# Patient Record
Sex: FEMALE | NOT HISPANIC OR LATINO | URBAN - METROPOLITAN AREA
[De-identification: names, ages, dates, MRNs, and addresses within clinical notes are randomized per-mention and may not be internally consistent; named-entity substitution may affect disease eponyms.]

---

## 2017-01-06 ENCOUNTER — COMMUNICATION - HEALTHEAST (OUTPATIENT)
Dept: FAMILY MEDICINE | Facility: CLINIC | Age: 52
End: 2017-01-06

## 2017-01-06 DIAGNOSIS — I10 ESSENTIAL HYPERTENSION: ICD-10-CM

## 2017-01-09 ENCOUNTER — COMMUNICATION - HEALTHEAST (OUTPATIENT)
Dept: SCHEDULING | Facility: CLINIC | Age: 52
End: 2017-01-09

## 2017-01-09 DIAGNOSIS — E87.6 POTASSIUM DEFICIENCY: ICD-10-CM

## 2017-01-27 ENCOUNTER — OFFICE VISIT - HEALTHEAST (OUTPATIENT)
Dept: FAMILY MEDICINE | Facility: CLINIC | Age: 52
End: 2017-01-27

## 2017-01-27 DIAGNOSIS — I10 ESSENTIAL HYPERTENSION: ICD-10-CM

## 2017-01-27 DIAGNOSIS — L68.9 EXCESSIVE HAIR GROWTH: ICD-10-CM

## 2017-01-27 ASSESSMENT — MIFFLIN-ST. JEOR: SCORE: 1155.04

## 2017-07-21 ENCOUNTER — OFFICE VISIT - HEALTHEAST (OUTPATIENT)
Dept: FAMILY MEDICINE | Facility: CLINIC | Age: 52
End: 2017-07-21

## 2017-07-21 DIAGNOSIS — Z00.00 HEALTH CARE MAINTENANCE: ICD-10-CM

## 2017-07-21 DIAGNOSIS — M25.551 HIP PAIN, CHRONIC, RIGHT: ICD-10-CM

## 2017-07-21 DIAGNOSIS — B35.1 TOENAIL FUNGUS: ICD-10-CM

## 2017-07-21 DIAGNOSIS — G89.29 HIP PAIN, CHRONIC, RIGHT: ICD-10-CM

## 2017-07-21 LAB
CHOLEST SERPL-MCNC: 182 MG/DL
FASTING STATUS PATIENT QL REPORTED: YES
HDLC SERPL-MCNC: 62 MG/DL
LDLC SERPL CALC-MCNC: 110 MG/DL
TRIGL SERPL-MCNC: 50 MG/DL

## 2017-07-21 ASSESSMENT — MIFFLIN-ST. JEOR: SCORE: 1137.46

## 2017-07-25 ENCOUNTER — COMMUNICATION - HEALTHEAST (OUTPATIENT)
Dept: FAMILY MEDICINE | Facility: CLINIC | Age: 52
End: 2017-07-25

## 2017-07-25 ENCOUNTER — AMBULATORY - HEALTHEAST (OUTPATIENT)
Dept: FAMILY MEDICINE | Facility: CLINIC | Age: 52
End: 2017-07-25

## 2017-07-25 DIAGNOSIS — Z00.00 HEALTH CARE MAINTENANCE: ICD-10-CM

## 2017-07-27 ENCOUNTER — COMMUNICATION - HEALTHEAST (OUTPATIENT)
Dept: PHYSICAL MEDICINE AND REHAB | Facility: CLINIC | Age: 52
End: 2017-07-27

## 2017-07-27 LAB
HPV INTERPRETATION - HISTORICAL: NORMAL
HPV INTERPRETER - HISTORICAL: NORMAL

## 2017-07-28 ENCOUNTER — HOSPITAL ENCOUNTER (OUTPATIENT)
Dept: MAMMOGRAPHY | Facility: CLINIC | Age: 52
Discharge: HOME | End: 2017-07-28

## 2017-07-28 DIAGNOSIS — Z12.31 VISIT FOR SCREENING MAMMOGRAM: ICD-10-CM

## 2017-07-28 LAB
BKR LAB AP ABNORMAL BLEEDING: NO
BKR LAB AP BIRTH CONTROL/HORMONES: NORMAL
BKR LAB AP CERVICAL APPEARANCE: NORMAL
BKR LAB AP GYN ADEQUACY: NORMAL
BKR LAB AP GYN INTERPRETATION: NORMAL
BKR LAB AP HPV REFLEX: NORMAL
BKR LAB AP LMP: NORMAL
BKR LAB AP PATIENT STATUS: NORMAL
BKR LAB AP PREVIOUS ABNORMAL: NORMAL
BKR LAB AP PREVIOUS NORMAL: 2016
HIGH RISK?: NO
PATH REPORT.COMMENTS IMP SPEC: NORMAL
RESULT FLAG (HE HISTORICAL CONVERSION): NORMAL

## 2017-08-11 ENCOUNTER — HOSPITAL ENCOUNTER (OUTPATIENT)
Dept: PHYSICAL MEDICINE AND REHAB | Facility: CLINIC | Age: 52
Discharge: HOME | End: 2017-08-11
Attending: PHYSICAL MEDICINE & REHABILITATION

## 2017-08-11 DIAGNOSIS — M54.50 LUMBAR SPINE PAIN: ICD-10-CM

## 2017-08-11 DIAGNOSIS — R29.898 HIP TIGHTNESS: ICD-10-CM

## 2017-08-11 DIAGNOSIS — M79.18 MYOFASCIAL PAIN: ICD-10-CM

## 2017-08-11 DIAGNOSIS — M54.16 LUMBAR RADICULAR PAIN: ICD-10-CM

## 2017-08-11 ASSESSMENT — MIFFLIN-ST. JEOR: SCORE: 1132.93

## 2017-09-01 ENCOUNTER — COMMUNICATION - HEALTHEAST (OUTPATIENT)
Dept: PHYSICAL MEDICINE AND REHAB | Facility: CLINIC | Age: 52
End: 2017-09-01

## 2017-09-06 ENCOUNTER — RECORDS - HEALTHEAST (OUTPATIENT)
Dept: ADMINISTRATIVE | Facility: OTHER | Age: 52
End: 2017-09-06

## 2017-09-08 ENCOUNTER — HOSPITAL ENCOUNTER (OUTPATIENT)
Dept: PHYSICAL MEDICINE AND REHAB | Facility: CLINIC | Age: 52
Discharge: HOME | End: 2017-09-08
Attending: PHYSICAL MEDICINE & REHABILITATION

## 2017-09-08 DIAGNOSIS — M99.06 SOMATIC DYSFUNCTION OF LOWER EXTREMITY: ICD-10-CM

## 2017-09-08 DIAGNOSIS — M79.18 MYOFASCIAL PAIN: ICD-10-CM

## 2017-09-08 DIAGNOSIS — M54.50 LUMBAR SPINE PAIN: ICD-10-CM

## 2017-09-08 DIAGNOSIS — M99.03 SOMATIC DYSFUNCTION OF LUMBAR REGION: ICD-10-CM

## 2017-09-08 DIAGNOSIS — M99.02 SOMATIC DYSFUNCTION OF THORACIC REGION: ICD-10-CM

## 2017-09-08 DIAGNOSIS — M99.04 SOMATIC DYSFUNCTION OF SACROILIAC JOINT: ICD-10-CM

## 2017-09-08 DIAGNOSIS — M51.369 DDD (DEGENERATIVE DISC DISEASE), LUMBAR: ICD-10-CM

## 2017-09-08 DIAGNOSIS — R29.898 HIP TIGHTNESS: ICD-10-CM

## 2017-09-08 DIAGNOSIS — M99.05 SOMATIC DYSFUNCTION OF PELVIS REGION: ICD-10-CM

## 2017-09-08 ASSESSMENT — MIFFLIN-ST. JEOR: SCORE: 1132.93

## 2017-09-21 ENCOUNTER — HOSPITAL ENCOUNTER (OUTPATIENT)
Dept: PHYSICAL MEDICINE AND REHAB | Facility: CLINIC | Age: 52
Discharge: HOME | End: 2017-09-21
Attending: PHYSICAL MEDICINE & REHABILITATION

## 2017-09-21 ENCOUNTER — OFFICE VISIT - HEALTHEAST (OUTPATIENT)
Dept: PHYSICAL THERAPY | Facility: CLINIC | Age: 52
End: 2017-09-21

## 2017-09-21 DIAGNOSIS — M99.01 SOMATIC DYSFUNCTION OF CERVICAL REGION: ICD-10-CM

## 2017-09-21 DIAGNOSIS — M99.07 SOMATIC DYSFUNCTION OF UPPER EXTREMITY: ICD-10-CM

## 2017-09-21 DIAGNOSIS — M54.50 LUMBAR SPINE PAIN: ICD-10-CM

## 2017-09-21 DIAGNOSIS — M99.08 SOMATIC DYSFUNCTION OF RIB REGION: ICD-10-CM

## 2017-09-21 DIAGNOSIS — M54.50 CHRONIC BILATERAL LOW BACK PAIN WITHOUT SCIATICA: ICD-10-CM

## 2017-09-21 DIAGNOSIS — M99.02 SOMATIC DYSFUNCTION OF THORACIC REGION: ICD-10-CM

## 2017-09-21 DIAGNOSIS — M99.06 SOMATIC DYSFUNCTION OF LOWER EXTREMITY: ICD-10-CM

## 2017-09-21 DIAGNOSIS — G89.29 CHRONIC BILATERAL LOW BACK PAIN WITHOUT SCIATICA: ICD-10-CM

## 2017-09-21 DIAGNOSIS — M99.04 SOMATIC DYSFUNCTION OF SACROILIAC JOINT: ICD-10-CM

## 2017-09-21 DIAGNOSIS — M99.03 SOMATIC DYSFUNCTION OF LUMBAR REGION: ICD-10-CM

## 2017-09-21 DIAGNOSIS — M99.05 SOMATIC DYSFUNCTION OF PELVIS REGION: ICD-10-CM

## 2017-09-21 DIAGNOSIS — M99.00 SOMATIC DYSFUNCTION OF HEAD REGION: ICD-10-CM

## 2017-09-21 DIAGNOSIS — M25.551 CHRONIC RIGHT HIP PAIN: ICD-10-CM

## 2017-09-21 DIAGNOSIS — M79.18 MYOFASCIAL PAIN: ICD-10-CM

## 2017-09-21 DIAGNOSIS — M51.369 DDD (DEGENERATIVE DISC DISEASE), LUMBAR: ICD-10-CM

## 2017-09-21 DIAGNOSIS — G89.29 CHRONIC RIGHT HIP PAIN: ICD-10-CM

## 2017-09-21 ASSESSMENT — MIFFLIN-ST. JEOR: SCORE: 1132.93

## 2017-09-26 ENCOUNTER — OFFICE VISIT - HEALTHEAST (OUTPATIENT)
Dept: PHYSICAL THERAPY | Facility: CLINIC | Age: 52
End: 2017-09-26

## 2017-09-26 DIAGNOSIS — G89.29 CHRONIC RIGHT HIP PAIN: ICD-10-CM

## 2017-09-26 DIAGNOSIS — M25.551 HIP PAIN, ACUTE, RIGHT: ICD-10-CM

## 2017-09-26 DIAGNOSIS — M54.50 CHRONIC BILATERAL LOW BACK PAIN WITHOUT SCIATICA: ICD-10-CM

## 2017-09-26 DIAGNOSIS — M25.551 CHRONIC RIGHT HIP PAIN: ICD-10-CM

## 2017-09-26 DIAGNOSIS — G89.29 CHRONIC BILATERAL LOW BACK PAIN WITHOUT SCIATICA: ICD-10-CM

## 2017-10-05 ENCOUNTER — OFFICE VISIT - HEALTHEAST (OUTPATIENT)
Dept: PHYSICAL THERAPY | Facility: CLINIC | Age: 52
End: 2017-10-05

## 2017-10-05 DIAGNOSIS — M54.50 CHRONIC BILATERAL LOW BACK PAIN WITHOUT SCIATICA: ICD-10-CM

## 2017-10-05 DIAGNOSIS — G89.29 CHRONIC BILATERAL LOW BACK PAIN WITHOUT SCIATICA: ICD-10-CM

## 2017-10-05 DIAGNOSIS — M25.551 CHRONIC RIGHT HIP PAIN: ICD-10-CM

## 2017-10-05 DIAGNOSIS — G89.29 CHRONIC RIGHT HIP PAIN: ICD-10-CM

## 2017-10-12 ENCOUNTER — OFFICE VISIT - HEALTHEAST (OUTPATIENT)
Dept: PHYSICAL THERAPY | Facility: CLINIC | Age: 52
End: 2017-10-12

## 2017-10-12 DIAGNOSIS — G89.29 CHRONIC RIGHT HIP PAIN: ICD-10-CM

## 2017-10-12 DIAGNOSIS — M25.551 CHRONIC RIGHT HIP PAIN: ICD-10-CM

## 2017-10-12 DIAGNOSIS — G89.29 CHRONIC BILATERAL LOW BACK PAIN WITHOUT SCIATICA: ICD-10-CM

## 2017-10-12 DIAGNOSIS — M54.50 CHRONIC BILATERAL LOW BACK PAIN WITHOUT SCIATICA: ICD-10-CM

## 2017-10-17 ENCOUNTER — OFFICE VISIT - HEALTHEAST (OUTPATIENT)
Dept: PHYSICAL THERAPY | Facility: CLINIC | Age: 52
End: 2017-10-17

## 2017-10-17 DIAGNOSIS — M54.50 CHRONIC BILATERAL LOW BACK PAIN WITHOUT SCIATICA: ICD-10-CM

## 2017-10-17 DIAGNOSIS — G89.29 CHRONIC RIGHT HIP PAIN: ICD-10-CM

## 2017-10-17 DIAGNOSIS — G89.29 CHRONIC BILATERAL LOW BACK PAIN WITHOUT SCIATICA: ICD-10-CM

## 2017-10-17 DIAGNOSIS — M25.551 CHRONIC RIGHT HIP PAIN: ICD-10-CM

## 2017-10-26 ENCOUNTER — OFFICE VISIT - HEALTHEAST (OUTPATIENT)
Dept: PHYSICAL THERAPY | Facility: CLINIC | Age: 52
End: 2017-10-26

## 2017-10-26 DIAGNOSIS — M54.50 CHRONIC BILATERAL LOW BACK PAIN WITHOUT SCIATICA: ICD-10-CM

## 2017-10-26 DIAGNOSIS — G89.29 CHRONIC BILATERAL LOW BACK PAIN WITHOUT SCIATICA: ICD-10-CM

## 2017-10-26 DIAGNOSIS — G89.29 CHRONIC RIGHT HIP PAIN: ICD-10-CM

## 2017-10-26 DIAGNOSIS — M25.551 CHRONIC RIGHT HIP PAIN: ICD-10-CM

## 2017-11-02 ENCOUNTER — OFFICE VISIT - HEALTHEAST (OUTPATIENT)
Dept: PHYSICAL THERAPY | Facility: CLINIC | Age: 52
End: 2017-11-02

## 2017-11-02 DIAGNOSIS — M25.551 CHRONIC RIGHT HIP PAIN: ICD-10-CM

## 2017-11-02 DIAGNOSIS — M54.50 CHRONIC BILATERAL LOW BACK PAIN WITHOUT SCIATICA: ICD-10-CM

## 2017-11-02 DIAGNOSIS — G89.29 CHRONIC RIGHT HIP PAIN: ICD-10-CM

## 2017-11-02 DIAGNOSIS — G89.29 CHRONIC BILATERAL LOW BACK PAIN WITHOUT SCIATICA: ICD-10-CM

## 2017-11-03 ENCOUNTER — COMMUNICATION - HEALTHEAST (OUTPATIENT)
Dept: FAMILY MEDICINE | Facility: CLINIC | Age: 52
End: 2017-11-03

## 2017-11-03 DIAGNOSIS — I10 HYPERTENSION, ESSENTIAL, BENIGN: ICD-10-CM

## 2017-11-09 ENCOUNTER — OFFICE VISIT - HEALTHEAST (OUTPATIENT)
Dept: PHYSICAL THERAPY | Facility: CLINIC | Age: 52
End: 2017-11-09

## 2017-11-09 DIAGNOSIS — M54.50 CHRONIC BILATERAL LOW BACK PAIN WITHOUT SCIATICA: ICD-10-CM

## 2017-11-09 DIAGNOSIS — G89.29 CHRONIC BILATERAL LOW BACK PAIN WITHOUT SCIATICA: ICD-10-CM

## 2017-11-09 DIAGNOSIS — M25.551 CHRONIC RIGHT HIP PAIN: ICD-10-CM

## 2017-11-09 DIAGNOSIS — G89.29 CHRONIC RIGHT HIP PAIN: ICD-10-CM

## 2017-11-16 ENCOUNTER — OFFICE VISIT - HEALTHEAST (OUTPATIENT)
Dept: PHYSICAL THERAPY | Facility: CLINIC | Age: 52
End: 2017-11-16

## 2017-11-16 DIAGNOSIS — M54.50 CHRONIC BILATERAL LOW BACK PAIN WITHOUT SCIATICA: ICD-10-CM

## 2017-11-16 DIAGNOSIS — M25.551 CHRONIC RIGHT HIP PAIN: ICD-10-CM

## 2017-11-16 DIAGNOSIS — G89.29 CHRONIC BILATERAL LOW BACK PAIN WITHOUT SCIATICA: ICD-10-CM

## 2017-11-16 DIAGNOSIS — G89.29 CHRONIC RIGHT HIP PAIN: ICD-10-CM

## 2017-11-21 ENCOUNTER — OFFICE VISIT - HEALTHEAST (OUTPATIENT)
Dept: PHYSICAL THERAPY | Facility: CLINIC | Age: 52
End: 2017-11-21

## 2017-11-21 DIAGNOSIS — M25.551 CHRONIC RIGHT HIP PAIN: ICD-10-CM

## 2017-11-21 DIAGNOSIS — G89.29 CHRONIC RIGHT HIP PAIN: ICD-10-CM

## 2017-11-21 DIAGNOSIS — G89.29 CHRONIC BILATERAL LOW BACK PAIN WITHOUT SCIATICA: ICD-10-CM

## 2017-11-21 DIAGNOSIS — M54.50 CHRONIC BILATERAL LOW BACK PAIN WITHOUT SCIATICA: ICD-10-CM

## 2017-11-28 ENCOUNTER — OFFICE VISIT - HEALTHEAST (OUTPATIENT)
Dept: PHYSICAL THERAPY | Facility: CLINIC | Age: 52
End: 2017-11-28

## 2017-11-28 DIAGNOSIS — M25.551 CHRONIC RIGHT HIP PAIN: ICD-10-CM

## 2017-11-28 DIAGNOSIS — G89.29 CHRONIC RIGHT HIP PAIN: ICD-10-CM

## 2017-11-28 DIAGNOSIS — G89.29 CHRONIC BILATERAL LOW BACK PAIN WITHOUT SCIATICA: ICD-10-CM

## 2017-11-28 DIAGNOSIS — M54.50 CHRONIC BILATERAL LOW BACK PAIN WITHOUT SCIATICA: ICD-10-CM

## 2017-12-07 ENCOUNTER — OFFICE VISIT - HEALTHEAST (OUTPATIENT)
Dept: PHYSICAL THERAPY | Facility: CLINIC | Age: 52
End: 2017-12-07

## 2017-12-07 DIAGNOSIS — G89.29 CHRONIC RIGHT HIP PAIN: ICD-10-CM

## 2017-12-07 DIAGNOSIS — M54.50 CHRONIC BILATERAL LOW BACK PAIN WITHOUT SCIATICA: ICD-10-CM

## 2017-12-07 DIAGNOSIS — M25.551 CHRONIC RIGHT HIP PAIN: ICD-10-CM

## 2017-12-07 DIAGNOSIS — G89.29 CHRONIC BILATERAL LOW BACK PAIN WITHOUT SCIATICA: ICD-10-CM

## 2017-12-14 ENCOUNTER — OFFICE VISIT - HEALTHEAST (OUTPATIENT)
Dept: PHYSICAL THERAPY | Facility: CLINIC | Age: 52
End: 2017-12-14

## 2017-12-14 DIAGNOSIS — G89.29 CHRONIC RIGHT HIP PAIN: ICD-10-CM

## 2017-12-14 DIAGNOSIS — M25.551 CHRONIC RIGHT HIP PAIN: ICD-10-CM

## 2017-12-14 DIAGNOSIS — M54.50 CHRONIC BILATERAL LOW BACK PAIN WITHOUT SCIATICA: ICD-10-CM

## 2017-12-14 DIAGNOSIS — G89.29 CHRONIC BILATERAL LOW BACK PAIN WITHOUT SCIATICA: ICD-10-CM

## 2017-12-15 ENCOUNTER — COMMUNICATION - HEALTHEAST (OUTPATIENT)
Dept: LAB | Facility: CLINIC | Age: 52
End: 2017-12-15

## 2017-12-15 ENCOUNTER — AMBULATORY - HEALTHEAST (OUTPATIENT)
Dept: FAMILY MEDICINE | Facility: CLINIC | Age: 52
End: 2017-12-15

## 2017-12-15 DIAGNOSIS — R79.89 ELEVATED VITAMIN B12 LEVEL: ICD-10-CM

## 2017-12-18 ENCOUNTER — AMBULATORY - HEALTHEAST (OUTPATIENT)
Dept: LAB | Facility: CLINIC | Age: 52
End: 2017-12-18

## 2017-12-18 DIAGNOSIS — R79.89 ELEVATED VITAMIN B12 LEVEL: ICD-10-CM

## 2017-12-18 DIAGNOSIS — Z00.00 HEALTH CARE MAINTENANCE: ICD-10-CM

## 2017-12-21 ENCOUNTER — OFFICE VISIT - HEALTHEAST (OUTPATIENT)
Dept: PHYSICAL THERAPY | Facility: CLINIC | Age: 52
End: 2017-12-21

## 2017-12-21 DIAGNOSIS — M25.551 CHRONIC RIGHT HIP PAIN: ICD-10-CM

## 2017-12-21 DIAGNOSIS — G89.29 CHRONIC RIGHT HIP PAIN: ICD-10-CM

## 2017-12-21 DIAGNOSIS — G89.29 CHRONIC BILATERAL LOW BACK PAIN WITHOUT SCIATICA: ICD-10-CM

## 2017-12-21 DIAGNOSIS — M54.50 CHRONIC BILATERAL LOW BACK PAIN WITHOUT SCIATICA: ICD-10-CM

## 2017-12-22 ENCOUNTER — COMMUNICATION - HEALTHEAST (OUTPATIENT)
Dept: FAMILY MEDICINE | Facility: CLINIC | Age: 52
End: 2017-12-22

## 2017-12-22 ENCOUNTER — AMBULATORY - HEALTHEAST (OUTPATIENT)
Dept: FAMILY MEDICINE | Facility: CLINIC | Age: 52
End: 2017-12-22

## 2017-12-22 ENCOUNTER — COMMUNICATION - HEALTHEAST (OUTPATIENT)
Dept: ONCOLOGY | Facility: HOSPITAL | Age: 52
End: 2017-12-22

## 2017-12-22 DIAGNOSIS — D72.819 WBC DECREASED: ICD-10-CM

## 2018-01-04 ENCOUNTER — OFFICE VISIT - HEALTHEAST (OUTPATIENT)
Dept: ONCOLOGY | Facility: CLINIC | Age: 53
End: 2018-01-04

## 2018-01-04 ENCOUNTER — COMMUNICATION - HEALTHEAST (OUTPATIENT)
Dept: FAMILY MEDICINE | Facility: CLINIC | Age: 53
End: 2018-01-04

## 2018-01-04 DIAGNOSIS — B35.1 TOENAIL FUNGUS: ICD-10-CM

## 2018-01-04 DIAGNOSIS — D72.819 WBC DECREASED: ICD-10-CM

## 2018-01-04 LAB
BASOPHILS # BLD AUTO: 0 THOU/UL (ref 0–0.2)
BASOPHILS # BLD AUTO: 0 THOU/UL (ref 0–0.2)
BASOPHILS NFR BLD AUTO: 0 % (ref 0–2)
BASOPHILS NFR BLD AUTO: 0 % (ref 0–2)
EOSINOPHIL # BLD AUTO: 0.1 THOU/UL (ref 0–0.4)
EOSINOPHIL # BLD AUTO: 0.1 THOU/UL (ref 0–0.4)
EOSINOPHIL NFR BLD AUTO: 1 % (ref 0–6)
EOSINOPHIL NFR BLD AUTO: 1 % (ref 0–6)
ERYTHROCYTE [DISTWIDTH] IN BLOOD BY AUTOMATED COUNT: 12.7 % (ref 11–14.5)
ERYTHROCYTE [DISTWIDTH] IN BLOOD BY AUTOMATED COUNT: 12.7 % (ref 11–14.5)
HCT VFR BLD AUTO: 35.2 % (ref 35–47)
HCT VFR BLD AUTO: 35.2 % (ref 35–47)
HGB BLD-MCNC: 12.4 G/DL (ref 12–16)
HGB BLD-MCNC: 12.4 G/DL (ref 12–16)
LYMPHOCYTES # BLD AUTO: 1.5 THOU/UL (ref 0.8–4.4)
LYMPHOCYTES # BLD AUTO: 1.5 THOU/UL (ref 0.8–4.4)
LYMPHOCYTES NFR BLD AUTO: 25 % (ref 20–40)
LYMPHOCYTES NFR BLD AUTO: 25 % (ref 20–40)
MCH RBC QN AUTO: 29 PG (ref 27–34)
MCH RBC QN AUTO: 29 PG (ref 27–34)
MCHC RBC AUTO-ENTMCNC: 35.2 G/DL (ref 32–36)
MCHC RBC AUTO-ENTMCNC: 35.2 G/DL (ref 32–36)
MCV RBC AUTO: 82 FL (ref 80–100)
MCV RBC AUTO: 82 FL (ref 80–100)
MONOCYTES # BLD AUTO: 0.3 THOU/UL (ref 0–0.9)
MONOCYTES # BLD AUTO: 0.3 THOU/UL (ref 0–0.9)
MONOCYTES NFR BLD AUTO: 6 % (ref 2–10)
MONOCYTES NFR BLD AUTO: 6 % (ref 2–10)
NEUTROPHILS # BLD AUTO: 3.9 THOU/UL (ref 2–7.7)
NEUTROPHILS # BLD AUTO: 3.9 THOU/UL (ref 2–7.7)
NEUTROPHILS NFR BLD AUTO: 68 % (ref 50–70)
NEUTROPHILS NFR BLD AUTO: 68 % (ref 50–70)
PATH REPORT.MICROSCOPIC SPEC OTHER STN: ABNORMAL
PLATELET # BLD AUTO: 250 THOU/UL (ref 140–440)
PLATELET # BLD AUTO: 250 THOU/UL (ref 140–440)
PMV BLD AUTO: 10.1 FL (ref 8.5–12.5)
PMV BLD AUTO: 10.1 FL (ref 8.5–12.5)
RBC # BLD AUTO: 4.27 MILL/UL (ref 3.8–5.4)
RBC # BLD AUTO: 4.27 MILL/UL (ref 3.8–5.4)
RETICS # AUTO: 0.04 MILL/UL (ref 0.01–0.11)
WBC: 5.8 THOU/UL (ref 4–11)
WBC: 5.8 THOU/UL (ref 4–11)

## 2018-01-05 LAB
LAB AP CHARGES (HE HISTORICAL CONVERSION): NORMAL
PATH REPORT.COMMENTS IMP SPEC: NORMAL
PATH REPORT.COMMENTS IMP SPEC: NORMAL
PATH REPORT.FINAL DX SPEC: NORMAL
PATH REPORT.MICROSCOPIC SPEC OTHER STN: NORMAL

## 2018-01-26 ENCOUNTER — OFFICE VISIT - HEALTHEAST (OUTPATIENT)
Dept: FAMILY MEDICINE | Facility: CLINIC | Age: 53
End: 2018-01-26

## 2018-01-26 ENCOUNTER — RECORDS - HEALTHEAST (OUTPATIENT)
Dept: GENERAL RADIOLOGY | Facility: CLINIC | Age: 53
End: 2018-01-26

## 2018-01-26 DIAGNOSIS — R22.31 LOCALIZED SWELLING, MASS AND LUMP, RIGHT UPPER LIMB: ICD-10-CM

## 2018-01-26 DIAGNOSIS — R22.31 NODULE OF FINGER OF RIGHT HAND: ICD-10-CM

## 2018-01-26 DIAGNOSIS — I10 HYPERTENSION: ICD-10-CM

## 2018-01-26 DIAGNOSIS — L30.9 ECZEMA: ICD-10-CM

## 2018-02-05 ENCOUNTER — COMMUNICATION - HEALTHEAST (OUTPATIENT)
Dept: FAMILY MEDICINE | Facility: CLINIC | Age: 53
End: 2018-02-05

## 2018-02-05 DIAGNOSIS — I10 ESSENTIAL HYPERTENSION: ICD-10-CM

## 2018-04-20 ENCOUNTER — COMMUNICATION - HEALTHEAST (OUTPATIENT)
Dept: FAMILY MEDICINE | Facility: CLINIC | Age: 53
End: 2018-04-20

## 2018-04-20 DIAGNOSIS — L68.9 EXCESSIVE HAIR GROWTH: ICD-10-CM

## 2018-05-15 ENCOUNTER — COMMUNICATION - HEALTHEAST (OUTPATIENT)
Dept: FAMILY MEDICINE | Facility: CLINIC | Age: 53
End: 2018-05-15

## 2018-05-15 DIAGNOSIS — I10 HYPERTENSION, ESSENTIAL, BENIGN: ICD-10-CM

## 2018-07-17 ENCOUNTER — COMMUNICATION - HEALTHEAST (OUTPATIENT)
Dept: FAMILY MEDICINE | Facility: CLINIC | Age: 53
End: 2018-07-17

## 2018-07-27 ENCOUNTER — OFFICE VISIT - HEALTHEAST (OUTPATIENT)
Dept: FAMILY MEDICINE | Facility: CLINIC | Age: 53
End: 2018-07-27

## 2018-07-27 ENCOUNTER — RECORDS - HEALTHEAST (OUTPATIENT)
Dept: GENERAL RADIOLOGY | Facility: CLINIC | Age: 53
End: 2018-07-27

## 2018-07-27 DIAGNOSIS — M25.532 LEFT WRIST PAIN: ICD-10-CM

## 2018-07-27 DIAGNOSIS — Z00.00 HEALTH CARE MAINTENANCE: ICD-10-CM

## 2018-07-27 DIAGNOSIS — M25.551 HIP PAIN, RIGHT: ICD-10-CM

## 2018-07-27 DIAGNOSIS — M21.611 BUNION, RIGHT: ICD-10-CM

## 2018-07-27 DIAGNOSIS — L68.9 EXCESSIVE HAIR GROWTH: ICD-10-CM

## 2018-07-27 DIAGNOSIS — M25.532 PAIN IN LEFT WRIST: ICD-10-CM

## 2018-07-27 DIAGNOSIS — B35.1 TOENAIL FUNGUS: ICD-10-CM

## 2018-07-27 LAB
ALBUMIN SERPL-MCNC: 3.9 G/DL (ref 3.5–5)
ALP SERPL-CCNC: 88 U/L (ref 45–120)
ALT SERPL W P-5'-P-CCNC: 29 U/L (ref 0–45)
ANION GAP SERPL CALCULATED.3IONS-SCNC: 6 MMOL/L (ref 5–18)
AST SERPL W P-5'-P-CCNC: 20 U/L (ref 0–40)
BASOPHILS # BLD AUTO: 0 THOU/UL (ref 0–0.2)
BASOPHILS NFR BLD AUTO: 0 % (ref 0–2)
BILIRUB SERPL-MCNC: 0.5 MG/DL (ref 0–1)
BUN SERPL-MCNC: 9 MG/DL (ref 8–22)
CALCIUM SERPL-MCNC: 9.7 MG/DL (ref 8.5–10.5)
CHLORIDE BLD-SCNC: 108 MMOL/L (ref 98–107)
CHOLEST SERPL-MCNC: 160 MG/DL
CO2 SERPL-SCNC: 28 MMOL/L (ref 22–31)
CREAT SERPL-MCNC: 0.83 MG/DL (ref 0.6–1.1)
EOSINOPHIL # BLD AUTO: 0.1 THOU/UL (ref 0–0.4)
EOSINOPHIL NFR BLD AUTO: 4 % (ref 0–6)
ERYTHROCYTE [DISTWIDTH] IN BLOOD BY AUTOMATED COUNT: 13.1 % (ref 11–14.5)
FASTING STATUS PATIENT QL REPORTED: YES
GFR SERPL CREATININE-BSD FRML MDRD: >60 ML/MIN/1.73M2
GLUCOSE BLD-MCNC: 76 MG/DL (ref 70–125)
HBA1C MFR BLD: 6 % (ref 3.5–6.1)
HCT VFR BLD AUTO: 37.9 % (ref 35–47)
HDLC SERPL-MCNC: 64 MG/DL
HGB BLD-MCNC: 12.8 G/DL (ref 12–16)
LDLC SERPL CALC-MCNC: 88 MG/DL
LYMPHOCYTES # BLD AUTO: 1 THOU/UL (ref 0.8–4.4)
LYMPHOCYTES NFR BLD AUTO: 34 % (ref 20–40)
MCH RBC QN AUTO: 29.2 PG (ref 27–34)
MCHC RBC AUTO-ENTMCNC: 33.7 G/DL (ref 32–36)
MCV RBC AUTO: 87 FL (ref 80–100)
MONOCYTES # BLD AUTO: 0.2 THOU/UL (ref 0–0.9)
MONOCYTES NFR BLD AUTO: 8 % (ref 2–10)
NEUTROPHILS # BLD AUTO: 1.6 THOU/UL (ref 2–7.7)
NEUTROPHILS NFR BLD AUTO: 54 % (ref 50–70)
PLATELET # BLD AUTO: 211 THOU/UL (ref 140–440)
PMV BLD AUTO: 7.6 FL (ref 7–10)
POTASSIUM BLD-SCNC: 4.2 MMOL/L (ref 3.5–5)
PROT SERPL-MCNC: 6.6 G/DL (ref 6–8)
RBC # BLD AUTO: 4.38 MILL/UL (ref 3.8–5.4)
SODIUM SERPL-SCNC: 142 MMOL/L (ref 136–145)
TRIGL SERPL-MCNC: 39 MG/DL
TSH SERPL DL<=0.005 MIU/L-ACNC: 0.74 UIU/ML (ref 0.3–5)
WBC: 2.9 THOU/UL (ref 4–11)

## 2018-07-27 ASSESSMENT — MIFFLIN-ST. JEOR: SCORE: 1137.46

## 2018-07-30 LAB
25(OH)D3 SERPL-MCNC: 38 NG/ML (ref 30–80)
25(OH)D3 SERPL-MCNC: 38 NG/ML (ref 30–80)
HPV SOURCE: NORMAL
HUMAN PAPILLOMA VIRUS 16 DNA: NEGATIVE
HUMAN PAPILLOMA VIRUS 18 DNA: NEGATIVE
HUMAN PAPILLOMA VIRUS FINAL DIAGNOSIS: NORMAL
HUMAN PAPILLOMA VIRUS OTHER HR: NEGATIVE
SPECIMEN DESCRIPTION: NORMAL

## 2018-08-02 LAB
BKR LAB AP ABNORMAL BLEEDING: NO
BKR LAB AP BIRTH CONTROL/HORMONES: NORMAL
BKR LAB AP CERVICAL APPEARANCE: NORMAL
BKR LAB AP GYN ADEQUACY: NORMAL
BKR LAB AP GYN INTERPRETATION: NORMAL
BKR LAB AP HPV REFLEX: NORMAL
BKR LAB AP LMP: NORMAL
BKR LAB AP PATIENT STATUS: NORMAL
BKR LAB AP PREVIOUS ABNORMAL: NORMAL
BKR LAB AP PREVIOUS NORMAL: 2017
HIGH RISK?: NO
PATH REPORT.COMMENTS IMP SPEC: NORMAL
RESULT FLAG (HE HISTORICAL CONVERSION): NORMAL

## 2018-08-03 ENCOUNTER — HOSPITAL ENCOUNTER (OUTPATIENT)
Dept: MAMMOGRAPHY | Facility: CLINIC | Age: 53
Discharge: HOME | End: 2018-08-03

## 2018-08-03 DIAGNOSIS — Z00.00 HEALTH CARE MAINTENANCE: ICD-10-CM

## 2018-08-09 ENCOUNTER — COMMUNICATION - HEALTHEAST (OUTPATIENT)
Dept: FAMILY MEDICINE | Facility: CLINIC | Age: 53
End: 2018-08-09

## 2018-08-24 ENCOUNTER — RECORDS - HEALTHEAST (OUTPATIENT)
Dept: ADMINISTRATIVE | Facility: OTHER | Age: 53
End: 2018-08-24

## 2018-08-24 ENCOUNTER — RECORDS - HEALTHEAST (OUTPATIENT)
Dept: BONE DENSITY | Facility: CLINIC | Age: 53
End: 2018-08-24

## 2018-08-24 DIAGNOSIS — Z00.00 ENCOUNTER FOR GENERAL ADULT MEDICAL EXAMINATION WITHOUT ABNORMAL FINDINGS: ICD-10-CM

## 2018-09-25 ENCOUNTER — OFFICE VISIT - HEALTHEAST (OUTPATIENT)
Dept: PHYSICAL MEDICINE AND REHAB | Facility: REHABILITATION | Age: 53
End: 2018-09-25

## 2018-09-25 DIAGNOSIS — M51.369 DDD (DEGENERATIVE DISC DISEASE), LUMBAR: ICD-10-CM

## 2018-09-25 DIAGNOSIS — M54.50 LUMBAR SPINE PAIN: ICD-10-CM

## 2018-09-25 DIAGNOSIS — R29.898 HIP TIGHTNESS: ICD-10-CM

## 2018-09-25 DIAGNOSIS — M79.18 GLUTEAL PAIN: ICD-10-CM

## 2018-09-28 ENCOUNTER — OFFICE VISIT - HEALTHEAST (OUTPATIENT)
Dept: PHYSICAL THERAPY | Facility: REHABILITATION | Age: 53
End: 2018-09-28

## 2018-09-28 DIAGNOSIS — M25.551 CHRONIC HIP PAIN, RIGHT: ICD-10-CM

## 2018-09-28 DIAGNOSIS — G89.29 CHRONIC HIP PAIN, RIGHT: ICD-10-CM

## 2018-09-28 DIAGNOSIS — M54.50 LUMBAR SPINE PAIN: ICD-10-CM

## 2018-09-28 DIAGNOSIS — M79.18 GLUTEAL PAIN: ICD-10-CM

## 2018-09-28 DIAGNOSIS — M51.369 DDD (DEGENERATIVE DISC DISEASE), LUMBAR: ICD-10-CM

## 2018-09-28 DIAGNOSIS — M79.18 RIGHT BUTTOCK PAIN: ICD-10-CM

## 2018-10-05 ENCOUNTER — OFFICE VISIT - HEALTHEAST (OUTPATIENT)
Dept: PHYSICAL THERAPY | Facility: REHABILITATION | Age: 53
End: 2018-10-05

## 2018-10-05 DIAGNOSIS — M79.18 RIGHT BUTTOCK PAIN: ICD-10-CM

## 2018-10-05 DIAGNOSIS — G89.29 CHRONIC HIP PAIN, RIGHT: ICD-10-CM

## 2018-10-05 DIAGNOSIS — M25.551 CHRONIC HIP PAIN, RIGHT: ICD-10-CM

## 2018-10-05 DIAGNOSIS — M51.369 DDD (DEGENERATIVE DISC DISEASE), LUMBAR: ICD-10-CM

## 2018-10-12 ENCOUNTER — OFFICE VISIT - HEALTHEAST (OUTPATIENT)
Dept: PHYSICAL THERAPY | Facility: REHABILITATION | Age: 53
End: 2018-10-12

## 2018-10-12 DIAGNOSIS — M79.18 RIGHT BUTTOCK PAIN: ICD-10-CM

## 2018-10-12 DIAGNOSIS — M51.369 DDD (DEGENERATIVE DISC DISEASE), LUMBAR: ICD-10-CM

## 2018-10-12 DIAGNOSIS — G89.29 CHRONIC HIP PAIN, RIGHT: ICD-10-CM

## 2018-10-12 DIAGNOSIS — M25.551 CHRONIC HIP PAIN, RIGHT: ICD-10-CM

## 2018-10-19 ENCOUNTER — OFFICE VISIT - HEALTHEAST (OUTPATIENT)
Dept: PHYSICAL THERAPY | Facility: REHABILITATION | Age: 53
End: 2018-10-19

## 2018-10-19 DIAGNOSIS — M79.18 RIGHT BUTTOCK PAIN: ICD-10-CM

## 2018-10-19 DIAGNOSIS — G89.29 CHRONIC HIP PAIN, RIGHT: ICD-10-CM

## 2018-10-19 DIAGNOSIS — M51.369 DDD (DEGENERATIVE DISC DISEASE), LUMBAR: ICD-10-CM

## 2018-10-19 DIAGNOSIS — M25.551 CHRONIC HIP PAIN, RIGHT: ICD-10-CM

## 2018-10-23 ENCOUNTER — COMMUNICATION - HEALTHEAST (OUTPATIENT)
Dept: SCHEDULING | Facility: CLINIC | Age: 53
End: 2018-10-23

## 2018-10-25 ENCOUNTER — OFFICE VISIT - HEALTHEAST (OUTPATIENT)
Dept: FAMILY MEDICINE | Facility: CLINIC | Age: 53
End: 2018-10-25

## 2018-10-25 DIAGNOSIS — I10 HYPERTENSION, ESSENTIAL, BENIGN: ICD-10-CM

## 2018-10-25 DIAGNOSIS — L03.011 CELLULITIS OF RIGHT FINGER: ICD-10-CM

## 2018-10-26 ENCOUNTER — OFFICE VISIT - HEALTHEAST (OUTPATIENT)
Dept: PHYSICAL THERAPY | Facility: REHABILITATION | Age: 53
End: 2018-10-26

## 2018-10-26 DIAGNOSIS — M25.551 CHRONIC HIP PAIN, RIGHT: ICD-10-CM

## 2018-10-26 DIAGNOSIS — M79.18 RIGHT BUTTOCK PAIN: ICD-10-CM

## 2018-10-26 DIAGNOSIS — G89.29 CHRONIC HIP PAIN, RIGHT: ICD-10-CM

## 2018-10-26 DIAGNOSIS — M51.369 DDD (DEGENERATIVE DISC DISEASE), LUMBAR: ICD-10-CM

## 2018-10-30 ENCOUNTER — RECORDS - HEALTHEAST (OUTPATIENT)
Dept: ADMINISTRATIVE | Facility: OTHER | Age: 53
End: 2018-10-30

## 2018-10-30 ENCOUNTER — COMMUNICATION - HEALTHEAST (OUTPATIENT)
Dept: FAMILY MEDICINE | Facility: CLINIC | Age: 53
End: 2018-10-30

## 2018-11-02 ENCOUNTER — OFFICE VISIT - HEALTHEAST (OUTPATIENT)
Dept: PHYSICAL THERAPY | Facility: REHABILITATION | Age: 53
End: 2018-11-02

## 2018-11-02 DIAGNOSIS — G89.29 CHRONIC HIP PAIN, RIGHT: ICD-10-CM

## 2018-11-02 DIAGNOSIS — M25.551 CHRONIC HIP PAIN, RIGHT: ICD-10-CM

## 2018-11-02 DIAGNOSIS — M79.18 RIGHT BUTTOCK PAIN: ICD-10-CM

## 2018-11-02 DIAGNOSIS — M79.18 GLUTEAL PAIN: ICD-10-CM

## 2018-11-02 DIAGNOSIS — M51.369 DDD (DEGENERATIVE DISC DISEASE), LUMBAR: ICD-10-CM

## 2018-11-09 ENCOUNTER — OFFICE VISIT - HEALTHEAST (OUTPATIENT)
Dept: PHYSICAL THERAPY | Facility: REHABILITATION | Age: 53
End: 2018-11-09

## 2018-11-09 DIAGNOSIS — G89.29 CHRONIC HIP PAIN, RIGHT: ICD-10-CM

## 2018-11-09 DIAGNOSIS — M79.18 RIGHT BUTTOCK PAIN: ICD-10-CM

## 2018-11-09 DIAGNOSIS — M25.551 HIP PAIN, ACUTE, RIGHT: ICD-10-CM

## 2018-11-09 DIAGNOSIS — M79.18 GLUTEAL PAIN: ICD-10-CM

## 2018-11-09 DIAGNOSIS — M25.551 CHRONIC HIP PAIN, RIGHT: ICD-10-CM

## 2018-11-09 DIAGNOSIS — M51.369 DDD (DEGENERATIVE DISC DISEASE), LUMBAR: ICD-10-CM

## 2018-11-14 ENCOUNTER — AMBULATORY - HEALTHEAST (OUTPATIENT)
Dept: FAMILY MEDICINE | Facility: CLINIC | Age: 53
End: 2018-11-14

## 2018-11-14 ENCOUNTER — COMMUNICATION - HEALTHEAST (OUTPATIENT)
Dept: FAMILY MEDICINE | Facility: CLINIC | Age: 53
End: 2018-11-14

## 2018-11-14 DIAGNOSIS — D72.819 LEUKOPENIA, UNSPECIFIED TYPE: ICD-10-CM

## 2018-11-16 ENCOUNTER — COMMUNICATION - HEALTHEAST (OUTPATIENT)
Dept: SCHEDULING | Facility: CLINIC | Age: 53
End: 2018-11-16

## 2018-11-16 ENCOUNTER — OFFICE VISIT - HEALTHEAST (OUTPATIENT)
Dept: FAMILY MEDICINE | Facility: CLINIC | Age: 53
End: 2018-11-16

## 2018-11-16 ENCOUNTER — OFFICE VISIT - HEALTHEAST (OUTPATIENT)
Dept: PHYSICAL THERAPY | Facility: REHABILITATION | Age: 53
End: 2018-11-16

## 2018-11-16 DIAGNOSIS — M79.18 RIGHT BUTTOCK PAIN: ICD-10-CM

## 2018-11-16 DIAGNOSIS — G89.29 CHRONIC HIP PAIN, RIGHT: ICD-10-CM

## 2018-11-16 DIAGNOSIS — B02.9 HERPES ZOSTER WITHOUT COMPLICATION: ICD-10-CM

## 2018-11-16 DIAGNOSIS — M25.551 CHRONIC HIP PAIN, RIGHT: ICD-10-CM

## 2018-11-16 DIAGNOSIS — M51.369 DDD (DEGENERATIVE DISC DISEASE), LUMBAR: ICD-10-CM

## 2018-11-16 DIAGNOSIS — M25.551 HIP PAIN, ACUTE, RIGHT: ICD-10-CM

## 2018-11-16 DIAGNOSIS — M79.18 GLUTEAL PAIN: ICD-10-CM

## 2018-11-28 ENCOUNTER — AMBULATORY - HEALTHEAST (OUTPATIENT)
Dept: LAB | Facility: CLINIC | Age: 53
End: 2018-11-28

## 2018-11-28 DIAGNOSIS — D72.819 LEUKOPENIA, UNSPECIFIED TYPE: ICD-10-CM

## 2018-11-28 LAB
BASOPHILS # BLD AUTO: 0 THOU/UL (ref 0–0.2)
BASOPHILS NFR BLD AUTO: 0 % (ref 0–2)
EOSINOPHIL # BLD AUTO: 0 THOU/UL (ref 0–0.4)
EOSINOPHIL NFR BLD AUTO: 1 % (ref 0–6)
ERYTHROCYTE [DISTWIDTH] IN BLOOD BY AUTOMATED COUNT: 12.7 % (ref 11–14.5)
HCT VFR BLD AUTO: 38.2 % (ref 35–47)
HGB BLD-MCNC: 12.9 G/DL (ref 12–16)
LYMPHOCYTES # BLD AUTO: 1 THOU/UL (ref 0.8–4.4)
LYMPHOCYTES NFR BLD AUTO: 32 % (ref 20–40)
MCH RBC QN AUTO: 29.6 PG (ref 27–34)
MCHC RBC AUTO-ENTMCNC: 33.7 G/DL (ref 32–36)
MCV RBC AUTO: 88 FL (ref 80–100)
MONOCYTES # BLD AUTO: 0.3 THOU/UL (ref 0–0.9)
MONOCYTES NFR BLD AUTO: 9 % (ref 2–10)
NEUTROPHILS # BLD AUTO: 1.8 THOU/UL (ref 2–7.7)
NEUTROPHILS NFR BLD AUTO: 58 % (ref 50–70)
PLATELET # BLD AUTO: 208 THOU/UL (ref 140–440)
PMV BLD AUTO: 8.3 FL (ref 7–10)
RBC # BLD AUTO: 4.34 MILL/UL (ref 3.8–5.4)
WBC: 3.2 THOU/UL (ref 4–11)

## 2018-11-29 LAB
BASOPHILS # BLD AUTO: 0 THOU/UL (ref 0–0.2)
BASOPHILS NFR BLD AUTO: 1 % (ref 0–2)
EOSINOPHIL # BLD AUTO: 0 THOU/UL (ref 0–0.4)
EOSINOPHIL NFR BLD AUTO: 1 % (ref 0–6)
ERYTHROCYTE [DISTWIDTH] IN BLOOD BY AUTOMATED COUNT: 12.8 % (ref 11–14.5)
HCT VFR BLD AUTO: 37.6 % (ref 35–47)
HGB BLD-MCNC: 12.9 G/DL (ref 12–16)
LAB AP CHARGES (HE HISTORICAL CONVERSION): NORMAL
LYMPHOCYTES # BLD AUTO: 0.9 THOU/UL (ref 0.8–4.4)
LYMPHOCYTES NFR BLD AUTO: 29 % (ref 20–40)
MCH RBC QN AUTO: 29.1 PG (ref 27–34)
MCHC RBC AUTO-ENTMCNC: 34.3 G/DL (ref 32–36)
MCV RBC AUTO: 85 FL (ref 80–100)
MONOCYTES # BLD AUTO: 0.3 THOU/UL (ref 0–0.9)
MONOCYTES NFR BLD AUTO: 11 % (ref 2–10)
NEUTROPHILS # BLD AUTO: 1.7 THOU/UL (ref 2–7.7)
NEUTROPHILS NFR BLD AUTO: 58 % (ref 50–70)
PATH REPORT.COMMENTS IMP SPEC: NORMAL
PATH REPORT.COMMENTS IMP SPEC: NORMAL
PATH REPORT.FINAL DX SPEC: NORMAL
PATH REPORT.MICROSCOPIC SPEC OTHER STN: ABNORMAL
PATH REPORT.MICROSCOPIC SPEC OTHER STN: NORMAL
PATH REPORT.RELEVANT HX SPEC: NORMAL
PLATELET # BLD AUTO: 206 THOU/UL (ref 140–440)
PMV BLD AUTO: 11 FL (ref 8.5–12.5)
RBC # BLD AUTO: 4.43 MILL/UL (ref 3.8–5.4)
WBC: 3 THOU/UL (ref 4–11)

## 2018-11-30 ENCOUNTER — OFFICE VISIT - HEALTHEAST (OUTPATIENT)
Dept: PHYSICAL THERAPY | Facility: REHABILITATION | Age: 53
End: 2018-11-30

## 2018-11-30 ENCOUNTER — OFFICE VISIT - HEALTHEAST (OUTPATIENT)
Dept: FAMILY MEDICINE | Facility: CLINIC | Age: 53
End: 2018-11-30

## 2018-11-30 DIAGNOSIS — G89.29 CHRONIC HIP PAIN, RIGHT: ICD-10-CM

## 2018-11-30 DIAGNOSIS — R21 RASH AND NONSPECIFIC SKIN ERUPTION: ICD-10-CM

## 2018-11-30 DIAGNOSIS — M79.18 GLUTEAL PAIN: ICD-10-CM

## 2018-11-30 DIAGNOSIS — B02.9 HERPES ZOSTER WITHOUT COMPLICATION: ICD-10-CM

## 2018-11-30 DIAGNOSIS — M79.18 RIGHT BUTTOCK PAIN: ICD-10-CM

## 2018-11-30 DIAGNOSIS — M51.369 DDD (DEGENERATIVE DISC DISEASE), LUMBAR: ICD-10-CM

## 2018-11-30 DIAGNOSIS — M25.551 CHRONIC HIP PAIN, RIGHT: ICD-10-CM

## 2018-12-05 ENCOUNTER — OFFICE VISIT - HEALTHEAST (OUTPATIENT)
Dept: ONCOLOGY | Facility: CLINIC | Age: 53
End: 2018-12-05

## 2018-12-05 ENCOUNTER — COMMUNICATION - HEALTHEAST (OUTPATIENT)
Dept: ADMINISTRATIVE | Facility: HOSPITAL | Age: 53
End: 2018-12-05

## 2018-12-05 DIAGNOSIS — D70.4 CYCLICAL NEUTROPENIA (H): ICD-10-CM

## 2018-12-05 RX ORDER — L.ACID/L.RHAM/B.BREVE/S.THERM 3B CELL
1 TABLET,CHEWABLE ORAL DAILY
Status: SHIPPED | COMMUNITY
Start: 2018-12-05

## 2018-12-07 ENCOUNTER — RECORDS - HEALTHEAST (OUTPATIENT)
Dept: ADMINISTRATIVE | Facility: OTHER | Age: 53
End: 2018-12-07

## 2018-12-10 ENCOUNTER — RECORDS - HEALTHEAST (OUTPATIENT)
Dept: ADMINISTRATIVE | Facility: OTHER | Age: 53
End: 2018-12-10

## 2018-12-10 LAB
LAB AP CHARGES (HE HISTORICAL CONVERSION): NORMAL
PATH REPORT.COMMENTS IMP SPEC: NORMAL
PATH REPORT.FINAL DX SPEC: NORMAL
PATH REPORT.GROSS SPEC: NORMAL
PATH REPORT.MICROSCOPIC SPEC OTHER STN: NORMAL
PATH REPORT.RELEVANT HX SPEC: NORMAL
RESULT FLAG (HE HISTORICAL CONVERSION): NORMAL

## 2018-12-12 ENCOUNTER — COMMUNICATION - HEALTHEAST (OUTPATIENT)
Dept: ONCOLOGY | Facility: CLINIC | Age: 53
End: 2018-12-12

## 2018-12-14 ENCOUNTER — OFFICE VISIT - HEALTHEAST (OUTPATIENT)
Dept: PHYSICAL THERAPY | Facility: REHABILITATION | Age: 53
End: 2018-12-14

## 2018-12-14 DIAGNOSIS — M25.551 CHRONIC HIP PAIN, RIGHT: ICD-10-CM

## 2018-12-14 DIAGNOSIS — M51.369 DDD (DEGENERATIVE DISC DISEASE), LUMBAR: ICD-10-CM

## 2018-12-14 DIAGNOSIS — G89.29 CHRONIC HIP PAIN, RIGHT: ICD-10-CM

## 2018-12-14 DIAGNOSIS — M79.18 RIGHT BUTTOCK PAIN: ICD-10-CM

## 2018-12-18 ENCOUNTER — COMMUNICATION - HEALTHEAST (OUTPATIENT)
Dept: FAMILY MEDICINE | Facility: CLINIC | Age: 53
End: 2018-12-18

## 2018-12-18 ENCOUNTER — RECORDS - HEALTHEAST (OUTPATIENT)
Dept: ADMINISTRATIVE | Facility: OTHER | Age: 53
End: 2018-12-18

## 2018-12-19 ENCOUNTER — OFFICE VISIT - HEALTHEAST (OUTPATIENT)
Dept: PODIATRY | Facility: CLINIC | Age: 53
End: 2018-12-19

## 2018-12-19 ENCOUNTER — AMBULATORY - HEALTHEAST (OUTPATIENT)
Dept: FAMILY MEDICINE | Facility: CLINIC | Age: 53
End: 2018-12-19

## 2018-12-19 ENCOUNTER — COMMUNICATION - HEALTHEAST (OUTPATIENT)
Dept: FAMILY MEDICINE | Facility: CLINIC | Age: 53
End: 2018-12-19

## 2018-12-19 DIAGNOSIS — I10 ESSENTIAL HYPERTENSION: ICD-10-CM

## 2018-12-19 DIAGNOSIS — M20.42 HAMMER TOES OF BOTH FEET: ICD-10-CM

## 2018-12-19 DIAGNOSIS — M20.41 HAMMER TOES OF BOTH FEET: ICD-10-CM

## 2018-12-19 DIAGNOSIS — M20.10 HALLUX VALGUS, UNSPECIFIED LATERALITY: ICD-10-CM

## 2018-12-19 ASSESSMENT — MIFFLIN-ST. JEOR: SCORE: 1123.85

## 2019-01-16 ENCOUNTER — AMBULATORY - HEALTHEAST (OUTPATIENT)
Dept: INFUSION THERAPY | Facility: CLINIC | Age: 54
End: 2019-01-16

## 2019-01-16 DIAGNOSIS — D70.4 CYCLICAL NEUTROPENIA (H): ICD-10-CM

## 2019-01-16 LAB
BASOPHILS # BLD AUTO: 0 THOU/UL (ref 0–0.2)
BASOPHILS NFR BLD AUTO: 1 % (ref 0–2)
EOSINOPHIL # BLD AUTO: 0.1 THOU/UL (ref 0–0.4)
EOSINOPHIL NFR BLD AUTO: 1 % (ref 0–6)
ERYTHROCYTE [DISTWIDTH] IN BLOOD BY AUTOMATED COUNT: 13.1 % (ref 11–14.5)
HCT VFR BLD AUTO: 37.2 % (ref 35–47)
HGB BLD-MCNC: 12.6 G/DL (ref 12–16)
LYMPHOCYTES # BLD AUTO: 1.2 THOU/UL (ref 0.8–4.4)
LYMPHOCYTES NFR BLD AUTO: 28 % (ref 20–40)
MCH RBC QN AUTO: 29.6 PG (ref 27–34)
MCHC RBC AUTO-ENTMCNC: 33.9 G/DL (ref 32–36)
MCV RBC AUTO: 87 FL (ref 80–100)
MONOCYTES # BLD AUTO: 0.4 THOU/UL (ref 0–0.9)
MONOCYTES NFR BLD AUTO: 9 % (ref 2–10)
NEUTROPHILS # BLD AUTO: 2.6 THOU/UL (ref 2–7.7)
NEUTROPHILS NFR BLD AUTO: 61 % (ref 50–70)
PLATELET # BLD AUTO: 217 THOU/UL (ref 140–440)
PMV BLD AUTO: 10.4 FL (ref 8.5–12.5)
RBC # BLD AUTO: 4.26 MILL/UL (ref 3.8–5.4)
WBC: 4.3 THOU/UL (ref 4–11)

## 2019-01-17 ENCOUNTER — COMMUNICATION - HEALTHEAST (OUTPATIENT)
Dept: ONCOLOGY | Facility: CLINIC | Age: 54
End: 2019-01-17

## 2019-01-18 ENCOUNTER — RECORDS - HEALTHEAST (OUTPATIENT)
Dept: ADMINISTRATIVE | Facility: OTHER | Age: 54
End: 2019-01-18

## 2019-04-09 ENCOUNTER — COMMUNICATION - HEALTHEAST (OUTPATIENT)
Dept: FAMILY MEDICINE | Facility: CLINIC | Age: 54
End: 2019-04-09

## 2019-04-09 DIAGNOSIS — I10 ESSENTIAL HYPERTENSION: ICD-10-CM

## 2019-07-16 ENCOUNTER — OFFICE VISIT - HEALTHEAST (OUTPATIENT)
Dept: FAMILY MEDICINE | Facility: CLINIC | Age: 54
End: 2019-07-16

## 2019-07-16 DIAGNOSIS — R10.2 PELVIC PAIN IN FEMALE: ICD-10-CM

## 2019-07-16 DIAGNOSIS — Z12.4 SCREENING FOR CERVICAL CANCER: ICD-10-CM

## 2019-07-16 DIAGNOSIS — Z00.00 ROUTINE GENERAL MEDICAL EXAMINATION AT A HEALTH CARE FACILITY: ICD-10-CM

## 2019-07-16 DIAGNOSIS — M79.644 PAIN OF FINGER OF RIGHT HAND: ICD-10-CM

## 2019-07-16 DIAGNOSIS — I10 ESSENTIAL HYPERTENSION: ICD-10-CM

## 2019-07-16 DIAGNOSIS — N95.2 ATROPHIC VAGINITIS: ICD-10-CM

## 2019-07-16 DIAGNOSIS — I10 HYPERTENSION, ESSENTIAL, BENIGN: ICD-10-CM

## 2019-07-16 DIAGNOSIS — Z12.31 VISIT FOR SCREENING MAMMOGRAM: ICD-10-CM

## 2019-07-16 DIAGNOSIS — E56.9 VITAMIN DEFICIENCY: ICD-10-CM

## 2019-07-16 DIAGNOSIS — B35.1 TOENAIL FUNGUS: ICD-10-CM

## 2019-07-16 DIAGNOSIS — D70.4 CYCLICAL NEUTROPENIA (H): ICD-10-CM

## 2019-07-16 LAB
ALBUMIN SERPL-MCNC: 4 G/DL (ref 3.5–5)
ALP SERPL-CCNC: 83 U/L (ref 45–120)
ALT SERPL W P-5'-P-CCNC: 30 U/L (ref 0–45)
ANION GAP SERPL CALCULATED.3IONS-SCNC: 8 MMOL/L (ref 5–18)
AST SERPL W P-5'-P-CCNC: 26 U/L (ref 0–40)
BILIRUB SERPL-MCNC: 0.4 MG/DL (ref 0–1)
BUN SERPL-MCNC: 10 MG/DL (ref 8–22)
CALCIUM SERPL-MCNC: 10 MG/DL (ref 8.5–10.5)
CHLORIDE BLD-SCNC: 104 MMOL/L (ref 98–107)
CHOLEST SERPL-MCNC: 180 MG/DL
CO2 SERPL-SCNC: 29 MMOL/L (ref 22–31)
CREAT SERPL-MCNC: 0.84 MG/DL (ref 0.6–1.1)
ERYTHROCYTE [DISTWIDTH] IN BLOOD BY AUTOMATED COUNT: 12.3 % (ref 11–14.5)
FASTING STATUS PATIENT QL REPORTED: NORMAL
GFR SERPL CREATININE-BSD FRML MDRD: >60 ML/MIN/1.73M2
GLUCOSE BLD-MCNC: 80 MG/DL (ref 70–125)
HBA1C MFR BLD: 5.8 % (ref 3.5–6)
HCT VFR BLD AUTO: 37.9 % (ref 35–47)
HDLC SERPL-MCNC: 71 MG/DL
HGB BLD-MCNC: 12.7 G/DL (ref 12–16)
LDLC SERPL CALC-MCNC: 101 MG/DL
MCH RBC QN AUTO: 28.9 PG (ref 27–34)
MCHC RBC AUTO-ENTMCNC: 33.4 G/DL (ref 32–36)
MCV RBC AUTO: 87 FL (ref 80–100)
PLATELET # BLD AUTO: 194 THOU/UL (ref 140–440)
PMV BLD AUTO: 8.6 FL (ref 7–10)
POTASSIUM BLD-SCNC: 3.6 MMOL/L (ref 3.5–5)
PROT SERPL-MCNC: 6.7 G/DL (ref 6–8)
RBC # BLD AUTO: 4.38 MILL/UL (ref 3.8–5.4)
SODIUM SERPL-SCNC: 141 MMOL/L (ref 136–145)
TRIGL SERPL-MCNC: 40 MG/DL
TSH SERPL DL<=0.005 MIU/L-ACNC: 0.93 UIU/ML (ref 0.3–5)
WBC: 3.4 THOU/UL (ref 4–11)

## 2019-07-16 ASSESSMENT — MIFFLIN-ST. JEOR: SCORE: 1129.3

## 2019-07-17 LAB
25(OH)D3 SERPL-MCNC: 44.6 NG/ML (ref 30–80)
25(OH)D3 SERPL-MCNC: 44.6 NG/ML (ref 30–80)
HPV SOURCE: NORMAL
HUMAN PAPILLOMA VIRUS 16 DNA: NEGATIVE
HUMAN PAPILLOMA VIRUS 18 DNA: NEGATIVE
HUMAN PAPILLOMA VIRUS FINAL DIAGNOSIS: NORMAL
HUMAN PAPILLOMA VIRUS OTHER HR: NEGATIVE
SPECIMEN DESCRIPTION: NORMAL

## 2019-07-22 LAB
BKR LAB AP ABNORMAL BLEEDING: NO
BKR LAB AP BIRTH CONTROL/HORMONES: NORMAL
BKR LAB AP CERVICAL APPEARANCE: NORMAL
BKR LAB AP GYN ADEQUACY: NORMAL
BKR LAB AP GYN INTERPRETATION: NORMAL
BKR LAB AP HPV REFLEX: NORMAL
BKR LAB AP LMP: 2010
BKR LAB AP PATIENT STATUS: NORMAL
BKR LAB AP PREVIOUS ABNORMAL: NORMAL
BKR LAB AP PREVIOUS NORMAL: NORMAL
HIGH RISK?: NO
PATH REPORT.COMMENTS IMP SPEC: NORMAL
RESULT FLAG (HE HISTORICAL CONVERSION): NORMAL

## 2019-07-25 ENCOUNTER — COMMUNICATION - HEALTHEAST (OUTPATIENT)
Dept: FAMILY MEDICINE | Facility: CLINIC | Age: 54
End: 2019-07-25

## 2019-07-25 DIAGNOSIS — D70.4 CYCLICAL NEUTROPENIA (H): ICD-10-CM

## 2019-08-09 ENCOUNTER — AMBULATORY - HEALTHEAST (OUTPATIENT)
Dept: LAB | Facility: CLINIC | Age: 54
End: 2019-08-09

## 2019-08-09 DIAGNOSIS — D70.4 CYCLICAL NEUTROPENIA (H): ICD-10-CM

## 2019-08-09 LAB
BASOPHILS # BLD AUTO: 0 THOU/UL (ref 0–0.2)
BASOPHILS NFR BLD AUTO: 1 % (ref 0–2)
EOSINOPHIL # BLD AUTO: 0.1 THOU/UL (ref 0–0.4)
EOSINOPHIL NFR BLD AUTO: 3 % (ref 0–6)
ERYTHROCYTE [DISTWIDTH] IN BLOOD BY AUTOMATED COUNT: 12.8 % (ref 11–14.5)
HCT VFR BLD AUTO: 37.7 % (ref 35–47)
HGB BLD-MCNC: 12.7 G/DL (ref 12–16)
LYMPHOCYTES # BLD AUTO: 1.1 THOU/UL (ref 0.8–4.4)
LYMPHOCYTES NFR BLD AUTO: 33 % (ref 20–40)
MCH RBC QN AUTO: 28.7 PG (ref 27–34)
MCHC RBC AUTO-ENTMCNC: 33.7 G/DL (ref 32–36)
MCV RBC AUTO: 85 FL (ref 80–100)
MONOCYTES # BLD AUTO: 0.5 THOU/UL (ref 0–0.9)
MONOCYTES NFR BLD AUTO: 14 % (ref 2–10)
NEUTROPHILS # BLD AUTO: 1.6 THOU/UL (ref 2–7.7)
NEUTROPHILS NFR BLD AUTO: 48 % (ref 50–70)
PATH REPORT.MICROSCOPIC SPEC OTHER STN: ABNORMAL
PLATELET # BLD AUTO: 202 THOU/UL (ref 140–440)
PMV BLD AUTO: 11.3 FL (ref 8.5–12.5)
RBC # BLD AUTO: 4.43 MILL/UL (ref 3.8–5.4)
WBC: 3.3 THOU/UL (ref 4–11)

## 2019-08-12 LAB
LAB AP CHARGES (HE HISTORICAL CONVERSION): NORMAL
PATH REPORT.COMMENTS IMP SPEC: NORMAL
PATH REPORT.COMMENTS IMP SPEC: NORMAL
PATH REPORT.FINAL DX SPEC: NORMAL
PATH REPORT.MICROSCOPIC SPEC OTHER STN: NORMAL
PATH REPORT.RELEVANT HX SPEC: NORMAL

## 2019-08-15 ENCOUNTER — RECORDS - HEALTHEAST (OUTPATIENT)
Dept: ADMINISTRATIVE | Facility: OTHER | Age: 54
End: 2019-08-15

## 2019-08-26 ENCOUNTER — COMMUNICATION - HEALTHEAST (OUTPATIENT)
Dept: FAMILY MEDICINE | Facility: CLINIC | Age: 54
End: 2019-08-26

## 2019-08-27 ENCOUNTER — COMMUNICATION - HEALTHEAST (OUTPATIENT)
Dept: FAMILY MEDICINE | Facility: CLINIC | Age: 54
End: 2019-08-27

## 2019-08-27 DIAGNOSIS — I10 ESSENTIAL HYPERTENSION: ICD-10-CM

## 2019-08-27 DIAGNOSIS — I10 HYPERTENSION, ESSENTIAL, BENIGN: ICD-10-CM

## 2019-08-27 RX ORDER — HYDROCHLOROTHIAZIDE 25 MG/1
25 TABLET ORAL
Qty: 180 TABLET | Refills: 1 | Status: SHIPPED | OUTPATIENT
Start: 2019-08-27

## 2020-05-06 ENCOUNTER — COMMUNICATION - HEALTHEAST (OUTPATIENT)
Dept: FAMILY MEDICINE | Facility: CLINIC | Age: 55
End: 2020-05-06

## 2020-05-06 DIAGNOSIS — B35.1 TOENAIL FUNGUS: ICD-10-CM

## 2020-05-06 DIAGNOSIS — L68.9 EXCESSIVE HAIR GROWTH: ICD-10-CM

## 2020-05-06 DIAGNOSIS — I10 ESSENTIAL HYPERTENSION: ICD-10-CM

## 2020-05-11 ENCOUNTER — COMMUNICATION - HEALTHEAST (OUTPATIENT)
Dept: FAMILY MEDICINE | Facility: CLINIC | Age: 55
End: 2020-05-11

## 2020-05-11 DIAGNOSIS — I10 HYPERTENSION, ESSENTIAL, BENIGN: ICD-10-CM

## 2020-05-11 DIAGNOSIS — B35.1 TOENAIL FUNGUS: ICD-10-CM

## 2020-05-11 DIAGNOSIS — L68.9 EXCESSIVE HAIR GROWTH: ICD-10-CM

## 2020-05-11 DIAGNOSIS — I10 ESSENTIAL HYPERTENSION: ICD-10-CM

## 2020-05-13 RX ORDER — CICLOPIROX 80 MG/ML
SOLUTION TOPICAL
Qty: 6.6 ML | Refills: 3 | Status: SHIPPED | OUTPATIENT
Start: 2020-05-13

## 2020-05-13 RX ORDER — POTASSIUM CHLORIDE 1500 MG/1
TABLET, EXTENDED RELEASE ORAL
Qty: 450 TABLET | Refills: 4 | Status: SHIPPED | OUTPATIENT
Start: 2020-05-13

## 2020-05-14 ENCOUNTER — COMMUNICATION - HEALTHEAST (OUTPATIENT)
Dept: PEDIATRICS | Facility: CLINIC | Age: 55
End: 2020-05-14

## 2020-05-14 ENCOUNTER — COMMUNICATION - HEALTHEAST (OUTPATIENT)
Dept: SCHEDULING | Facility: CLINIC | Age: 55
End: 2020-05-14

## 2020-05-14 DIAGNOSIS — L68.9 EXCESSIVE HAIR GROWTH: ICD-10-CM

## 2020-05-14 RX ORDER — EFLORNITHINE HYDROCHLORIDE 139 MG/G
CREAM TOPICAL
Qty: 45 G | Refills: 4 | Status: SHIPPED | OUTPATIENT
Start: 2020-05-14

## 2021-05-27 NOTE — TELEPHONE ENCOUNTER
Refill Request  Did you contact pharmacy: No.  Patient was informed to call the pharmacy.  Medication name:   Requested Prescriptions     Pending Prescriptions Disp Refills     potassium chloride (K-DUR,KLOR-CON) 20 MEQ tablet 450 tablet 0     Sig: TAKE 2 TABLETS IN THE MORNING AND 3 TABLETS IN THE EVENING (5 TOTAL PER DAY)     Who prescribed the medication: Sosa Loya NP   Pharmacy Name and Location: [facility]  Is patient out of medication: No.  14 days left  Patient notified refills processed in 72 hours:  yes  Okay to leave a detailed message: no

## 2021-05-27 NOTE — TELEPHONE ENCOUNTER
Former patient of Vincenzo & has not established care with another provider.  Please assign refill request to covering provider per Clinic standard process.        Refill Approved    Rx renewed per Medication Renewal Policy. Medication was last renewed on 12/19/18.    Sweetie Gonzales, [facility] Triage/Med Refill 4/10/2019     Requested Prescriptions   Pending Prescriptions Disp Refills     potassium chloride (K-DUR,KLOR-CON) 20 MEQ tablet 450 tablet 0     Sig: TAKE 2 TABLETS IN THE MORNING AND 3 TABLETS IN THE EVENING (5 TOTAL PER DAY)       Potassium Supplements Refill Protocol Passed - 4/9/2019  2:22 PM        Passed - PCP or prescribing provider visit in past 12 months       Last office visit with prescriber/PCP: Visit date not found OR same dept: 11/30/2018 Sosa Loya NP OR same specialty: 11/30/2018 Sosa Loya NP  Last physical: Visit date not found Last MTM visit: Visit date not found   Next visit within 3 mo: Visit date not found  Next physical within 3 mo: Visit date not found  Prescriber OR PCP: No Primary Care Provider  Last diagnosis associated with med order: 1. Essential hypertension  - potassium chloride (K-DUR,KLOR-CON) 20 MEQ tablet; TAKE 2 TABLETS IN THE MORNING AND 3 TABLETS IN THE EVENING (5 TOTAL PER DAY)  Dispense: 450 tablet; Refill: 0    If protocol passes may refill for 12 months if within 3 months of last provider visit (or a total of 15 months).             Passed - Potassium level in last 12 months     Lab Results   Component Value Date    Potassium 4.2 07/27/2018

## 2021-05-30 VITALS — WEIGHT: 134 LBS | HEIGHT: 62 IN | BODY MASS INDEX: 24.66 KG/M2

## 2021-05-30 NOTE — PROGRESS NOTES
Dear Jennifer,    Your recent Pap smear result came back within normal limits including negative for presence of any high risk viruses which are responsible for cervical cancer , we will plan to repeat the pap smear in 5 yr  Please feel free to call if you have any concerns or questions..    Yue Zuñiga MD 7/23/2019 11:54 AM

## 2021-05-31 VITALS — HEIGHT: 62 IN | WEIGHT: 130 LBS | BODY MASS INDEX: 23.92 KG/M2

## 2021-05-31 VITALS — WEIGHT: 130.5 LBS | BODY MASS INDEX: 23.87 KG/M2

## 2021-05-31 VITALS — WEIGHT: 130 LBS | HEIGHT: 62 IN | BODY MASS INDEX: 23.92 KG/M2

## 2021-05-31 VITALS — WEIGHT: 131 LBS | HEIGHT: 62 IN | BODY MASS INDEX: 24.11 KG/M2

## 2021-05-31 VITALS — BODY MASS INDEX: 23.78 KG/M2 | WEIGHT: 130 LBS

## 2021-05-31 NOTE — TELEPHONE ENCOUNTER
Who is calling:  Patient  Reason for Call:    Patient is questioning if the Pelvic Ultrasound report was faxed from [facility].  Date of last appointment with primary care: 7/16/19  Okay to leave a detailed message: Yes

## 2021-05-31 NOTE — TELEPHONE ENCOUNTER
This message/results were addressed in another telephone encounter. No further action needed for results.   Vannesa Lizarraga, ALEXISN

## 2021-05-31 NOTE — TELEPHONE ENCOUNTER
Who is calling:  Patient   Reason for Call:  Patient states that she had Mammogram and Ultra Sound on 8/15/19 at [facility] Faxed results to clinic . If clinic received the results please call patient and inform the results.   Date of last appointment with primary care: 7/16/19  Okay to leave a detailed message: No

## 2021-05-31 NOTE — TELEPHONE ENCOUNTER
Did call [facility] to refax results to office  They will do it now 08/27 at 1145 am  Will keep an eye out for the papers    Mattie Sanders, JAYE

## 2021-05-31 NOTE — TELEPHONE ENCOUNTER
"Test Results  Who is calling?:  Patient   Who ordered the test:Patient is  defensive, argumentative  and agiated in this converstaion. Patient is unhappy that her results from [facility] for mammogram and pelvic US have not been relayed in a timely matter.  The exams were done on 8/15/19 She continues to be want to be sent to  voice mail of the assistant and when shared that [facility] helps with this job for the last six years patient basicially called writer a liar.  Patient also made disparging remarks in regard to Dr Zuñiga's care.  Writer shared issue with RN manager.   The Information patient relays is that [facility] sent the results to clinic at fax # [phone] (note last digit is not same as clinic main fax of [phone]). Patient states [facility] also pushed the results through PAC system.    Type of test: Imaging Patient hung up on writer prior to getting more information.  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  Please address patient's behaviour towards  staff.   Date of test:  8/15/19  Where was the test performed:  [facility]  What are your questions/concerns?:  Please see above  Okay to leave a detailed message?:  Yes    "

## 2021-05-31 NOTE — TELEPHONE ENCOUNTER
Patient was  very demanding  and pussy through out the visit and also during her future emails , she should be given warning that we will not be able to provide the care if she continue to harash the staff or providers.     Yue Zuñiga MD 8/28/2019 9:41 AM

## 2021-05-31 NOTE — TELEPHONE ENCOUNTER
Medication Question or Clarification  Who is calling: Patient  What medication are you calling about? (include dose and sig)   hydroCHLOROthiazide (HYDRODIURIL) 25 MG tablet 180 tablet 1 7/16/2019     Sig - Route: Take 1 tablet (25 mg total) by mouth 2 (two) times a day at 9am and 6pm. - Oral    Class: Print      potassium chloride (K-DUR,KLOR-CON) 20 MEQ tablet 450 tablet 4 7/16/2019     Sig: TAKE 2 TABLETS IN THE MORNING AND 3 TABLETS IN THE EVENING (5 TOTAL PER DAY)    Class: Print        Who prescribed the medication?:   Yue Zuñiga MD  What is your question/concern?:   Patient is requesting new prescriptions of above medications be sent to mail delivery Pharmacy.  90 day supply with 3 refills on  hydroCHLOROthiazide (HYDRODIURIL) 25 MG tablet.   potassium chloride (K-DUR,KLOR-CON) 20 MEQ tablet  tablet prescription can stay the same.  Pharmacy:   [facility] [address]     Okay to leave a detailed message?: Yes  Site CMT - Please call the pharmacy to obtain any additional needed information.    Please call patient when prescriptions are sent.

## 2021-05-31 NOTE — TELEPHONE ENCOUNTER
Did get results from [facility]   Dr. Richardson did read and advised both were negative   Pt was advised - mammogram and ultrasound both negative     Encounter closed    Mattie Sanders, CMA

## 2021-05-31 NOTE — TELEPHONE ENCOUNTER
Last appt was 07/16/19   Last med order hctz was 07/16/19 for 180  And potassium 07/16/19 for 450    Will pend medications     Mattie Sanders, CMA

## 2021-06-01 VITALS — HEIGHT: 62 IN | BODY MASS INDEX: 24.11 KG/M2 | WEIGHT: 131 LBS

## 2021-06-01 VITALS — BODY MASS INDEX: 24.14 KG/M2 | WEIGHT: 132 LBS

## 2021-06-02 VITALS — BODY MASS INDEX: 23.41 KG/M2 | WEIGHT: 128 LBS

## 2021-06-02 VITALS — BODY MASS INDEX: 23.32 KG/M2 | WEIGHT: 127.5 LBS

## 2021-06-02 VITALS — BODY MASS INDEX: 23.58 KG/M2 | WEIGHT: 128.9 LBS

## 2021-06-02 VITALS — WEIGHT: 128.2 LBS | BODY MASS INDEX: 23.45 KG/M2

## 2021-06-02 VITALS — WEIGHT: 128 LBS | HEIGHT: 62 IN | BODY MASS INDEX: 23.55 KG/M2

## 2021-06-03 VITALS — HEIGHT: 62 IN | WEIGHT: 129.2 LBS | BODY MASS INDEX: 23.77 KG/M2

## 2021-06-08 NOTE — TELEPHONE ENCOUNTER
"RN Triage  Pharmacy calling, vaniqa cream does not have instructions for use. They need instruction in order to dispense rx.     Per previous note: pharmacy looking for apply topically to affected area \"x\" number of times per day.     I advised Pharmacy staff I would reach out to on call physician to obtain an order for this information. Page sent at [phone].    Reason for Disposition    Pharmacy calling with prescription questions and triager unable to answer question    Protocols used: MEDICATION QUESTION CALL-A-AH      "

## 2021-06-08 NOTE — TELEPHONE ENCOUNTER
Pharmacy sent a fax back asking for Direction Clarification.     Message from pharmacy:  (1)direction clarification-pls fill in blank.[place] Topically AA______________ time(s) a day(2) drug is not covered,costs 208.99$    Please Advise

## 2021-06-08 NOTE — PROGRESS NOTES
"Assessment/Plan:        Diagnoses and associated orders for this visit:          Essential hypertension  Hx of Hypokalemia    Well controlled today,  Continue with HCTZ 25mg once per day, 1 tablet in the morning and 1/2 tablet in PM  She does have history of hypokalemia, she does take 5 tabs of potassium for 4 years with normal to low potassium levels.   She is keeping a log and within normal limits.   Reinforced  lifestyle changes, decreasing salt intake to no more than 2 grams per day and increasing exercise.  Follow up for 6 month blood pressure check.  Patient agreed with plan!      Vitals:    01/27/17 0909   BP: 106/78   Patient Site: Left Arm   Patient Position: Sitting   Cuff Size: Adult Regular   Pulse: 72   Temp: 98.2  F (36.8  C)   TempSrc: Oral   Weight: 134 lb (60.8 kg)   Height: 5' 2.25\" (1.581 m)       - hydrochlorothiazide (HYDRODIURIL) 25 MG tablet; Take 1 tablet (25 mg total) by mouth daily.  Dispense: 90 tablet; Refill: 3            HPI:  Jennifer Alexander is a 51 y.o. female who presents today for blood pressure check.     History of hypertension,   Well controlled with HCTZ. She is taking 1 ful tablet of 25mg in the morning and 1/2 tablet in the afternoon.   She is monitoring her blood pressure and logging it and within normal range.   Denies any chest pain, racing skipped heartbeat, palpitations, swelling in LE.   Denies any acute concerns.       Reviewed and updated below  Allergies   Allergen Reactions     Hydrocodone Shortness Of Breath and Nausea And Vomiting     Tetracycline Nausea And Vomiting     Current Outpatient Prescriptions   Medication Sig Dispense Refill     aspirin 81 mg chewable tablet Chew 81 mg. Twice per week       calcium-vitamin D 500 mg(1,250mg) -200 unit per tablet Take 1 tablet by mouth 2 (two) times a day with meals.       ciclopirox (PENLAC) 8 % solution Apply topically bedtime. Apply over nail and surrounding skin. Apply daily over previous coat. After seven (7) days, " may remove with alcohol and continue cycle.       CYANOCOBALAMIN, VITAMIN B-12, (VITAMIN B-12 SL) Place under the tongue.       DENYS PRIMROSE/LINOLEIC/GAMOLENI (PRIMROSE OIL ORAL) Take by mouth.       glucosamine-chondroitin 500-400 mg cap Take 1 capsule by mouth 2 (two) times a day.        hydroCHLOROthiazide (HYDRODIURIL) 25 MG tablet TAKE 1 TABLET TWICE A DAY AT 9:00 A.M. AND 6:00 P.M. (Patient taking differently: 1 1/2 tabs twice daily) 180 tablet 2     multivitamin therapeutic (THERAGRAN) tablet Take 1 tablet by mouth daily.       eflornithine (VANIQA) 13.9 % cream Apply as needed 30 g 0     potassium chloride SA (KLOR-CON M20) 20 MEQ tablet Take 1 tablet (20 mEq total) by mouth 5 (five) times a day. 450 tablet 0     No current facility-administered medications for this visit.      Past Medical History   Diagnosis Date     Hypertension      Past Surgical History   Procedure Laterality Date     Hysterectomy       supracervical 2010     Oophorectomy       1 removed, 1 remains     Social History     Social History     Marital status: Single     Spouse name: N/A     Number of children: 0     Years of education: N/A     Occupational History     [employer]     Social History Main Topics     Smoking status: Never Smoker     Smokeless tobacco: Never Used     Alcohol use No     Drug use: No     Sexual activity: Yes     Partners: Male     Birth control/ protection: Surgical     Other Topics Concern     None     Social History Narrative     Family History   Problem Relation Age of Onset     Hypertension Mother      Hypertension Father      Stroke Maternal Grandfather          Review of Systems   Constitutional: Negative.   HENT: Negative.   Eyes: Negative.   Respiratory: Negative.   Cardiovascular: Negative.   Gastrointestinal: Negative.   Endocrine: Negative.   Genitourinary: Negative.   Musculoskeletal: Negative.   Skin: Negative.   Allergic/Immunologic: Negative.   Neurological: Negative.  "  Hematological: Negative.   Psychiatric/Behavioral: Negative.       Objective:     Physical Exam   Visit Vitals     /78 (Patient Site: Left Arm, Patient Position: Sitting, Cuff Size: Adult Regular)     Pulse 72     Temp 98.2  F (36.8  C) (Oral)     Ht 5' 2.25\" (1.581 m)     Wt 134 lb (60.8 kg)     BMI 24.31 kg/m2       Constitutional: Alert and oriented x3, well nourished without any acute distress  Cardiovascular: Normal S1 and S2. Regular rate, rhythm and no murmur, rubs or gallops. Palpable distal pulses bilaterally.  Pulmonary/Chest: Normal effort. Lungs clear to auscultation in all lobes. Without wheezing, rhonchi or crackles.    Abdominal: Soft, non tenderness. There is no rebound or guarding. Bowel sounds x4. Without organomegaly. No CVA tenderness.  Musculoskeletal: 5/5 strength  And full ROM in all extremities. No joint swelling or deformity  Neurological: Cranial nerves intact, without deficit. Without numbness, tingling or paresthesia. Normal reflexes  Skin: Dry and intact; without rashes or lesions.   Psychiatric: Normal mood and affect.             "

## 2021-06-08 NOTE — TELEPHONE ENCOUNTER
On call provider was paged. Dr. Wilde states Pt can apply 2 times a day.  Called for the pharmacy. Relayed provider recommendation.  No other concern at this time.        Balaji Andrews RN, [facility] Triage/Med Refill 5/14/2020 7:54 PM

## 2021-06-12 NOTE — PROGRESS NOTES
Assessment/Plan:      Diagnoses and all orders for this visit:    Lumbar spine pain  -     Ambulatory referral to Physical Therapy    DDD (degenerative disc disease), lumbar  -     Ambulatory referral to Physical Therapy    Myofascial pain  -     Ambulatory referral to Physical Therapy    Hip tightness  -     Ambulatory referral to Physical Therapy    Somatic dysfunction of thoracic region    Somatic dysfunction of lumbar region    Somatic dysfunction of sacroiliac joint    Somatic dysfunction of pelvis region    Somatic dysfunction of lower extremity        Assessment:Very pleasant 52-year-old female with a history of hypertension with: Urine a half history of right groin hip pain     1.    Persistent lumbar spine pain at the lumbosacral junction into the right lateral calf.  Symptoms are intermittent but are interfering with her life.  This can be related to S1 radicular symptoms as she has some mild lateral recess stenosis at L5-S1 from a broad-based disc protrusion or could be myofascial related..  2.    Lumbar degenerative disc disease at L5-S1 with broad-based disc bulge and mild facet arthropathy.      3. Myofascial pain in the pelvic muscles and greater trochanters.  May have a component of trochanteric bursitis.    4.  Somatic dysfunctions of the   thoracic spine, lumbar spine, sacrum, pelvis, lower extremities that contribute to the patient's pain complaints.    5.  Some decreased range of motion of the right hip with hip capsule tightness and some mild osteoarthritis on plain films.  Cannot exclude labral tear as cause for some of her pain.       Discussion:    1. *I had a lengthy discussion with her.  We discussed the MRI.  We discussed treatment options including therapy, manual medicine, injections, medications.  She wants to avoid medications if possible.  2.  Start physical therapy trial with [facility].  4-6 visits.  Has had 15 visits with postural restoration therapy with no benefit.  I feel that [facility]  would be helpful for strengthening the lumbar spine and help with some of the gluteal pain.  They can also add some pelvic floor strengthening.  3.  Trial Osteopathic manipulative medicine.  She did have backward sacral torsion today which could be a large contributor to her gluteal and leg pain.  4.  I like her to return to clinic for full body OMT treatment in 2 weeks.    25 minutes were spent with this patient in addition to any procedure with greater than 20 minutes in counseling and coordination of care.      It was our pleasure caring for your patient today, if there any questions or concerns please do not hesitate to contact us.      Subjective:   Patient ID: Jennifer Alexander is a 52 y.o. female.    History of Present Illness: Patient presents for reevaluation of lumbar spine pain, right hip pain and right leg pain.  Symptoms have been unchanged since last visit but persisted over quite a long time.  She does have pain across the lumbosacral junction.  Difficult to know what makes her pain worse in the back as there is no specific trigger.  She also has pain in the right groin down the lateral leg to the lateral calf.  Again difficult to know as there is no specific trigger.  Seems to be better with heat and massage.  Pain is a 10/10 at worst 5/10 today, 0/10 at best.  Has had 15 visits with postural restoration therapy.  No significant benefit.  She did have a therapeutic massage at one point which was very helpful but only temporary.  Has recently had MRI done and has several questions today regarding MRI results.    Imaging:  MRI report and images were personally reviewed and discussed with the patient.  A plastic model was utilized during the discussion.  MRI of the lumbar spine from [facility] personally reviewed.  Mild degenerative T2 signal loss of the L5-S1 disc with high intensity zone.  Minimal central disc protrusion with some mild bilateral lateral recess narrowing no foraminal stenosis at  any level.  Mild facet arthropathy L5-S1 no real degenerative change at other levels other than some possible minimal facet arthropathy at L4-5.  On my review no impingement of the hips seen.  No AVN.    Review of systems: No numbness, tingling or weakness.  No bowel or bladder incontinence.  No headaches, dizziness, nausea, vomiting, blurred vision or balance deficits.    Past Medical History:   Diagnosis Date     Hypertension        The following portions of the patient's history were reviewed and updated as appropriate: allergies, current medications, past family history, past medical history, past social history, past surgical history and problem list.      Objective:   Physical Exam:    Vitals:    09/08/17 0903   BP: 116/69   Pulse: 75       General: Alert and oriented with normal affect. Attention, knowledge, memory, and language are intact. No acute distress.   Eyes: Sclerae are clear.  Respirations: Unlabored. CV: No lower extremity edema.   Gait:  Nonantalgic  Sensation is intact to light touch throughout the lower extremities.  Reflexes are  2+ patellar and 1+Achilles  .  Some tenderness over the right greater trochanter and gluteus medius.  Manual muscle testing reveals:  Right /Left out of 5    5/5 hip flexors  5/5 knee flexors  5/5 knee extensors  5/5 ankle plantar flexors  5/5 ankle dorsiflexors  5/5  EHL        Structural exam:  Thoracic spine: , T12 rotated left sidebent left. Lumbar spine: L5 rotated right sidebent left. Pelvis: Right innominate upslip, anterior inferior right innominate. Sacrum: Left on right backward sacral torsion. Lower extremity: Hypertonic hip flexors, right gluteus medius tender point, right tibiotalar restriction     Procedure:    After discussing the risks and benefits of osteopathic manipulative medicine, verbal consent was obtained. The somatic dysfunctions listed above were treated with the following techniques:   Thoracic spine: Myofascial release, .  Lumbar spine:  Myofascial release technique, still technique. Pelvis: Still technique and Isometrics. Sacrum: Muscle energy and myofascial release.  Lower extremities: Muscle energy, F NJ, gentle leg tugs.   The patient tolerated the procedure well and had improved range of motion in all areas treated prior to leaving the clinic.

## 2021-06-12 NOTE — PROGRESS NOTES
Assessment/Plan:      Diagnoses and all orders for this visit:    Lumbar spine pain  -     Cancel: MR Lumbar Spine Without Contrast; Future; Expected date: 8/11/17  -     MR Lumbar Spine Without Contrast; Future; Expected date: 8/11/17  -     MR Lumbar Spine Without Contrast; Standing  -     MR Lumbar Spine Without Contrast    Lumbar radicular pain  -     Cancel: MR Lumbar Spine Without Contrast; Future; Expected date: 8/11/17  -     MR Lumbar Spine Without Contrast; Future; Expected date: 8/11/17  -     MR Lumbar Spine Without Contrast; Standing  -     MR Lumbar Spine Without Contrast    Myofascial pain  -     Cancel: MR Lumbar Spine Without Contrast; Future; Expected date: 8/11/17  -     MR Lumbar Spine Without Contrast; Future; Expected date: 8/11/17  -     MR Lumbar Spine Without Contrast; Standing  -     MR Lumbar Spine Without Contrast    Hip tightness        Assessment:Very pleasant 52-year-old female with a history of hypertension with: Urine a half history of right groin hip pain    1.  Progressing to lumbar spine pain at the lumbosacral junction into the right lateral calf.  Symptoms are intermittent but are interfering with her life appeared consistent with lumbar radicular pain.  2.  Myofascial pain in the pelvic muscles and greater trochanters.  May have a component of trochanteric bursitis.  3.  Some decreased range of motion of the right hip with hip capsule tightness and some mild osteoarthritis on plain films.      Discussion:    1.  I discussed the diagnoses and treatment options.  We discussed the plain films of the been done.  We discussed that she has been to therapy and done numerous over-the-counter treatment options.  2.  MRI lumbar spine to evaluate.  3.  Continue over-the-counter patches topical creams and medications.  4.  Return to clinic 3-4 weeks for reevaluation and discuss imaging.      It was our pleasure caring for your patient today, if there any questions or concerns please do not  hesitate to contact us.      Subjective:   Patient ID: Jennifer Alexander is a 52 y.o. female.    History of Present Illness: Patient presents at the request of Sosa Loya for evaluation of lumbar spine pain right gluteal and leg pain.  Symptoms started in November 2015.  No trauma.  Was doing a lot of hiking and other activities.  Started with sharp pain in the right groin.  Managed with ice and yoga but the pain persisted and progressed to right greater trochanter pain and gluteal pain and subsequently lumbar spine pain at the lumbosacral junction on the right.  Over time she has also developed some right lateral calf pain occasionally.  Her greater trochanter pain is constant in the hip/groin pain has improved and only occurs on a few occasions.  The lumbar spine pain as a dull achy pain worse with any sitting or certain stretches and yoga better with ice.  Ibuprofen also helps but she is only taking this on 3 occasions this year and she has them documented.  No numbness or tingling down the right leg.  Over the past couple of days she is also developed right knee pain but feels that was due to some of her workout machines she does a 0 gravity runner.    She has been through extensive physical therapy.  She has tried cyclobenzaprine which was no help.  Uses ice, Kinesio tape, stretching, yoga.  Kinesio tape has been the best thing for her.  She does feel that her quality of life is limited she has to limit some of her activities with yoga.      Imaging: *Plain films lumbar spine show normal segmentation.  Possibly some mild degenerative changes L5-S1 disc space.  Mild right hip space joint narrowing on plain films of the right hip.    Review of Systems: Complains of swelling in her ankle, back pain, leg pain.  No bowel or bladder incontinence no rashes. Remainder of 12 point review systems negative unless listed above.    Past Medical History:   Diagnosis Date     Hypertension        The following portions of the  patient's history were reviewed and updated as appropriate: allergies, current medications, past family history, past medical history, past social history, past surgical history and problem list.      WHO 5: 25    MARION Score: 2      Objective:   Physical Exam:    Vitals:    08/11/17 1022   BP: 115/69   Pulse: 65       General:  Well-appearing female in no acute distress.  Pleasant, cooperative, and interactive throughout the examination and interview.  CV: No lower extremity edema on inspection or paltation.  Lymphatics: No cervical lymphadenopathy palpated. Eyes: sclera clear. Skin: No rashes or lesions seen over the head/neck, hairline, arms, legs,  .  Respirations unlabored.  MSK: Gait is normal.  Able to heel-toe walk without difficulty.  Negative Romberg.  Spine: normal AP curves of the C, T, and L spine.  Full range of motion in the Lumbar spine in all planes.  Palpation: Tenderness to palpation over right greater than left greater trochanters and gluteal tissues.  Extremities: Full range of motion of the elbows, and wrists with no effusions or tenderness to palpation.   Mild decreased range of motion right hip and internal rotation from seated and supine with no reproduction of pain on range of motion testing.  Full range of motion of the left hip, knees, and ankles with no effusions or tenderness to palpation.  Negative scour maneuver and Donal's test bilaterally. No hypermobility of the upper or lower extremities.  Some distal right lower extremity pain and neural tension signs such as Donal's position.  Neurologic exam: Mental status: Patient is alert and oriented with normal affect.  Attention, knowledge, memory, and language are intact.  Normal coordination throughout the examination.  Reflexes are 2+ and symmetric biceps, triceps, brachioradialis, patellar, and Achilles with down-going toes and Negative Sonia's.  Sensation is intact to light touch throughout the upper and lower extremities  bilaterally.  Manual muscle testing reveals 5 out of 5 in the hip flexors, knee flexors/extensors, ankle plantar flexors, ankle  dorsiflexors, and EHL.  Upper extremities: Grossly normal strength . Normal muscle bulk and tone in the arms and legs.    Negative seated and supine straight leg raise bilaterally.

## 2021-06-12 NOTE — PROGRESS NOTES
Assessment/Plan:        Diagnoses and associated orders for this visit:    [facility] maintenance  Colonoscopy up to date  Mammogram scheduled 7/28/2017  Ordered fasting labs and will follow up once received  Pap smear completed today requests annual pap smears, will follow up with lab and pap results once received.   We discussed healthy lifestyle, nutrition, cardiovascular risk reduction, self care, safety, self breast exams, sunscreen, and seatbelt screening.  Recommended annual physical exam or sooner for any acute concerns.   Jennifer did agree with plan.     - Lipid [place] FASTING  - Comprehensive Metabolic Panel  - Thyroid [place]  - Vitamin D, Total (25-Hydroxy)  - HM2(CBC w/o Differential)  - Mammo Screening Bilateral; Future  - Gynecologic Cytology (PAP Smear)  -     Hip pain,right chronic  She has completed physical therapy, previous xrays in the past.  Referral placed to spine care, Jennifer did request this and agrees with plan.   Continue with stretching, activity to tolerance, NSAIDS. Heat does help, continue with heat and will send in muscle relaxers.   Discussed red flags of back pain- fever, chills, progressive neurological changes, bowel/bladder changes to follow up for urgent evaluation.   Follow up in clinic if any worsening symptoms, questions or concerns.  Patient agreed and appeared pleased with plan  - Ambulatory referral to Spine Care      Essential hypertension  Hx of Hypokalemia    Well controlled today.   Continue with HCTZ 25mg in morning and 12.5mg in the afternoon.  She does have history of hypokalemia, she does take 5 tabs of potassium for 4 years with normal to low potassium levels.   She is keeping a log and within normal limits.   Reinforced  lifestyle changes, decreasing salt intake to no more than 2 grams per day and increasing exercise.  Follow up for 6 month blood pressure check.  Patient agreed with plan!      Vitals:    07/21/17 0847   BP: 100/78   Pulse: 68   Weight: 131  "lb (59.4 kg)   Height: 5' 2\" (1.575 m)       - hydrochlorothiazide (HYDRODIURIL) 25 MG tablet; Take 1 tablet (25 mg total) by mouth daily.  Dispense: 90 tablet; Refill: 3          Toenail fungus  - ciclopirox (PENLAC) 8 % solution; Apply over nail and surrounding skin. Apply daily over previous coat. After seven (7) days, may remove with alcohol and continue cycle.  Dispense: 6.6 mL; Refill: 0          HPI:  Jennifer Alexander is a 52 y.o. female who is here for a health maintenance visit.  She is still having right hip pain, has tried physical therapy without relief.   But denies numbness, tingling, fever, chills, bowel bladder changes.   She would like a referral to spine clinic today.     History of hypertension,   Well controlled with HCTZ  Denies any chest pain, racing skipped heartbeat, palpitations, swelling in LE.   Denies any acute concerns.     She would like refill of her ciclopirox.   She is also taking Vitamin B12 and feels that this causes her to gain weight, she would like her vitamin B12 lab completed today    Jennifer is fasting today.     Reviewed past medical/surgical history, family history, social history, medications and updated chart below.     Allergies   Allergen Reactions     Hydrocodone Shortness Of Breath and Nausea And Vomiting     Tetracycline Nausea And Vomiting     Current Outpatient Prescriptions   Medication Sig Dispense Refill     aspirin 81 mg chewable tablet Chew 81 mg. Twice per week       calcium-vitamin D 500 mg(1,250mg) -200 unit per tablet Take 1 tablet by mouth 2 (two) times a day with meals.       ciclopirox (PENLAC) 8 % solution Apply over nail and surrounding skin. Apply daily over previous coat. After seven (7) days, may remove with alcohol and continue cycle. 6.6 mL 0     CYANOCOBALAMIN, VITAMIN B-12, (VITAMIN B-12 SL) Place under the tongue.       eflornithine (VANIQA) 13.9 % cream Apply as needed 30 g 0     DENYS PRIMROSE/LINOLEIC/GAMOLENI (PRIMROSE OIL ORAL) Take by " "mouth.       glucosamine-chondroitin 500-400 mg cap Take 1 capsule by mouth 2 (two) times a day.        hydroCHLOROthiazide (HYDRODIURIL) 25 MG tablet TAKE 1 TABLET TWICE A DAY AT 9:00 A.M. AND 6:00 P.M. (Patient taking differently: 1 1/2 tabs twice daily) 180 tablet 2     multivitamin therapeutic (THERAGRAN) tablet Take 1 tablet by mouth daily.       potassium chloride SA (KLOR-CON M20) 20 MEQ tablet Take 1 tablet (20 mEq total) by mouth 5 (five) times a day. 450 tablet 3     No current facility-administered medications for this visit.      Past Medical History:   Diagnosis Date     Hypertension      Past Surgical History:   Procedure Laterality Date     HYSTERECTOMY      supracervical 2010     OOPHORECTOMY      1 removed, 1 remains     Social History     Social History     Marital status: Single     Spouse name: N/A     Number of children: 0     Years of education: N/A     Occupational History     [employer]     Social History Main Topics     Smoking status: Never Smoker     Smokeless tobacco: Never Used     Alcohol use No     Drug use: No     Sexual activity: Yes     Partners: Male     Birth control/ protection: Surgical     Other Topics Concern     None     Social History Narrative     Family History   Problem Relation Age of Onset     Hypertension Mother      Hypertension Father      Stroke Maternal Grandfather          Review of Systems   Constitutional: Negative.   HENT: Negative.   Eyes: Negative.   Respiratory: Negative.   Cardiovascular: Negative.   Gastrointestinal: Negative.   Endocrine: Negative.   Genitourinary: Negative.   Musculoskeletal: Chronic hip pain   Skin: Negative.   Allergic/Immunologic: Negative.   Neurological: Negative.   Hematological: Negative.   Psychiatric/Behavioral: Negative.       Objective:     Physical Exam   /78  Pulse 68  Ht 5' 2\" (1.575 m)  Wt 131 lb (59.4 kg)  Breastfeeding? No  BMI 23.96 kg/m2    Constitutional: Alert and oriented x3, well " nourished without any acute distress  HENT:   Right Ear: External ear normal.   Left Ear: External ear normal.   Nose: Nose normal.   Mouth/Throat: Oropharynx is clear and moist.   Eyes: Conjunctivae and EOM are normal. Pupils are equal, round, and reactive to light. Right eye exhibits no discharge. Left eye exhibits no discharge.   Neck: No thyromegaly present.   Lymphadenopathy: Without palpable lymphadenopathy  Cardiovascular: Normal S1 and S2. Regular rate, rhythm and no murmur, rubs or gallops. Palpable distal pulses bilaterally.  Pulmonary/Chest: Normal effort. Lungs clear to auscultation in all lobes. Without wheezing, rhonchi or crackles.    Abdominal: Soft, non tenderness. There is no rebound or guarding. Bowel sounds x4. Without organomegaly. No CVA tenderness.  Musculoskeletal: 5/5 strength  And full ROM in all extremities. No joint swelling or deformity  Neurological: Cranial nerves intact, without deficit. Without numbness, tingling or paresthesia. Normal reflexes  Skin: Dry and intact; without rashes or lesions.   Psychiatric: Normal mood and affect.   : External female genitalia without lesions. Introitus is normal, vaginal walls pink and moist without lesions. There is no cervical motion tenderness and the adnexa are without masses. There is no abnormal discharge from the cervix.   Breast: No chest deformity, asymmetry. Normal contours. Without nodules, masses, tenderness, or axillary adenopathy. No nipple discharge or dimpling noted.

## 2021-06-13 NOTE — PROGRESS NOTES
"[facility] Daily Progress     Patient Name: Jennifer Alexander  Date: 10/12/2017  Visit #: 4  Referral Diagnosis: Lumbar spine DDD  Referring provider: Dr. Quentin Marshall  Visit Diagnosis:     ICD-10-CM    1. Chronic bilateral low back pain without sciatica M54.5     G89.29    2. Chronic right hip pain M25.551     G89.29          Assessment:   Patientvcontinues to note improvement following the manual therapy. She reports occurrence of increased R hip pain following cleaning fro 8 hours. PT educated the patient on activity management.  Patient is currently in the 2nd week of the MedX program and tolerating appropriately.    HEP/POC compliance is  good .  Patient is appropriate to continue with skilled physical therapy intervention, as indicated by initial plan of care.    Goal Status:  Pt. will be independent with home exercise program in : 4 weeks  Pt will: decrease MARION by 30% in 8 weeks  Pt will: increase lumbar extension strength by 30# in 10 weeks    Plan / Patient Education:     Continue with initial plan of care.  Progress with home program as tolerated.    PLAN for next visit: Bridge  Subjective:     Pain Ratin in the hip and calf  Patient states to have had set-back this week and had to use the K-tape. She maybe was more active.  The LB is 0/10. Patient is also noting groin pain for the first time in awhile    Objective:   Patient is currently in the second week of the MedX machine.    Exercises: see flow sheet  Exercise #1: kneeling hip flexor stretch x 30 seconds/leg  Comment #1: Seated piriformis stretch x 30 seconds/leg  Exercise #2: Nustep x 5 minutes  Comment #2: Rotary Torso 90\" 26# started to the L    Lumbar MedX Initial testing (17 4-week Re-test   AROM (full=  0-72  lumbar) 0-66    Max Extension Torque  100#    Average Extension Torque     Flex: ext ratio (ideal 1.4:1) 2.33:1        Treatment Today     TREATMENT MINUTES COMMENTS   Evaluation     Self-care/ Home management   "   Manual therapy 11 Patient positioned in supine- TPR to R lateral proximal quad and R lateral gastroc   Neuromuscular Re-education     Therapeutic Activity     Therapeutic Exercises 16 See flow sheet   Gait training     Modality__________________                Total 27    Blank areas are intentional and mean the treatment did not include these items.       Romelia Chau  10/12/2017

## 2021-06-13 NOTE — PROGRESS NOTES
[facility] Daily Progress     Patient Name: Jennifer Alexander  Date: 10/26/2017  Visit #: 6  Referral Diagnosis: Lumbar spine DDD  Referring provider: Dr. Quentin Marshall  Visit Diagnosis:     ICD-10-CM    1. Chronic bilateral low back pain without sciatica M54.5     G89.29    2. Chronic right hip pain M25.551     G89.29          Assessment:   Patient is now reporting the manual therapy to not be as effective and we progressed to R hip joint mobilization. She is demonstrating reduction in anterior glide of the R hip and reduction in R hip IR. She continues to tolerate and progress with her lumbar extensor strength on the MedX Machine.  HEP/POC compliance is  good .  Patient is appropriate to continue with skilled physical therapy intervention, as indicated by initial plan of care.    Goal Status:  Pt. will be independent with home exercise program in : 4 weeks  Pt will: decrease MARION by 30% in 8 weeks  Pt will: increase lumbar extension strength by 30# in 10 weeks    Plan / Patient Education:     Continue with initial plan of care.  Progress with home program as tolerated.    PLAN for next visit: Bridge  Subjective:     Pain Ratin in the hip   She cotninues to note increased pain in the R hip and not noting any change with the manual treatment performed last session. This is a change from what has been previously reported. She is also noting some difficulty with crunches that she does in her exercise classes. PT will review the set-up today.  Objective:   Patient is currently in the third week of the MedX machine.  PT educated the patient on the dosage of the IBP taking one dose of 200 mg just once is not much at all to benefit her.    Patient asks what type of results she should be experiencing at this point because she is not sure she is seeing any. PT explained changing manual treatment and patient did note some improvement from the joint mobilization versus the STM. PT educated the patient on using  "  Exercises: see flow sheet  Exercise #1: kneeling hip flexor stretch x 30 seconds/leg  Comment #1: Seated piriformis stretch x 30 seconds/leg  Exercise #2: Nustep x 0 minutes, treadmill x 3.5 minutes  Comment #2: Rotary Torso 90\" 28# started to the L    Lumbar MedX Initial testing (9/21/17 4-week Re-test   AROM (full=  0-72  lumbar) 0-66    Max Extension Torque  100#    Average Extension Torque     Flex: ext ratio (ideal 1.4:1) 2.33:1        Treatment Today     TREATMENT MINUTES COMMENTS   Evaluation     Self-care/ Home management     Manual therapy 15 Patient positioned in supine- R hip join mobilization 1) distraction with belt in hooklying and 90 degrees 2) MWM IR and ER   Patient positioned in prone- 1) Anterior glide to R hip in neutral and figure 4 2) caudal   Neuromuscular Re-education     Therapeutic Activity     Therapeutic Exercises 13 See flow sheet  Educated the patient at length on what may have caused her pain   Gait training     Modality__________________                Total 28    Blank areas are intentional and mean the treatment did not include these items.       Romelia Chau  10/26/2017    "
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
## 2021-06-13 NOTE — PROGRESS NOTES
"[facility] Daily Progress     Patient Name: Jennifer Alexander  Date: 2017  Visit #: 2  Referral Diagnosis: Lumbar spine DDD  Referring provider: Dr. Quentin Marshall  Visit Diagnosis:     ICD-10-CM    1. Chronic bilateral low back pain without sciatica M54.5     G89.29    2. Chronic right hip pain M25.551     G89.29    3. Hip pain, acute, right M25.551          Assessment:   PT initiated dynamic exercise with the patient this visit on the Lumbar MedX. Thus far, the patient is in the first week of the MedX program and tolerating appropriately.     HEP/POC compliance is  good .  Patient is appropriate to continue with skilled physical therapy intervention, as indicated by initial plan of care.    Goal Status:  Pt. will be independent with home exercise program in : 4 weeks  Pt will: decrease MARION by 30% in 8 weeks  Pt will: increase lumbar extension strength by 30# in 10 weeks    Plan / Patient Education:     Continue with initial plan of care.  Progress with home program as tolerated.    PLAN for next visit: add manual therapy back into to R posterior hip  Subjective:     Pain Ratin  Patient has been feeling pretty good since the last session. She was traveling to her home in [place] and did notice any issues or problem.  Patient is reporting the stretches to be good and compliant with the home exercises.    Objective:   Patient is currently in the first week of the MedX machine.    Exercises: see flow sheet  Exercise #1: kneeling hip flexor stretch x 30 seconds/leg  Comment #1: Seated piriformis stretch x 30 seconds/leg  Exercise #2: Nustep x 5 minutes  Comment #2: Rotary Torso 90\" 24# started to the R    Lumbar MedX Initial testing (17 4-week Re-test   AROM (full=  0-72  lumbar) 0-66    Max Extension Torque  100#    Average Extension Torque     Flex: ext ratio (ideal 1.4:1) 2.33:1        Treatment Today     TREATMENT MINUTES COMMENTS   Evaluation     Self-care/ Home management     Manual therapy   "   Neuromuscular Re-education     Therapeutic Activity     Therapeutic Exercises 25 See flow sheet   Gait training     Modality__________________                Total 25    Blank areas are intentional and mean the treatment did not include these items.       Romelia Chau  9/26/2017

## 2021-06-13 NOTE — PROGRESS NOTES
"[facility] Daily Progress     Patient Name: Jennifer Alexander  Date: 10/5/2017  Visit #: 3  Referral Diagnosis: Lumbar spine DDD  Referring provider: Dr. Quentin Marshall  Visit Diagnosis:     ICD-10-CM    1. Chronic bilateral low back pain without sciatica M54.5     G89.29    2. Chronic right hip pain M25.551     G89.29          Assessment:   Patient is demonstrating improvement in the muscular tone of the R anterior hip. PT focused manual therapy on the R gastroc per the patients' symptoms today. Overall, she is noting improvement in flexibility and reduction in her symptoms.  Patient is currently in the 2nd week of the MedX program and tolerating appropriately.    HEP/POC compliance is  good .  Patient is appropriate to continue with skilled physical therapy intervention, as indicated by initial plan of care.    Goal Status:  Pt. will be independent with home exercise program in : 4 weeks  Pt will: decrease MARION by 30% in 8 weeks  Pt will: increase lumbar extension strength by 30# in 10 weeks    Plan / Patient Education:     Continue with initial plan of care.  Progress with home program as tolerated.    PLAN for next visit: Sonal  Subjective:     Pain Ratin  Patient is having a scratchy throat from allergies. Her R calf has been a little more pronounced today.  She is noting the hip to be okay and to be able to stretch a little further than previous.     Objective:   Patient is currently in the second week of the MedX machine.    Exercises: see flow sheet  Exercise #1: kneeling hip flexor stretch x 30 seconds/leg  Comment #1: Seated piriformis stretch x 30 seconds/leg  Exercise #2: Nustep x 5 minutes  Comment #2: Rotary Torso 90\" 26# started to the L    Lumbar MedX Initial testing (17 4-week Re-test   AROM (full=  0-72  lumbar) 0-66    Max Extension Torque  100#    Average Extension Torque     Flex: ext ratio (ideal 1.4:1) 2.33:1        Treatment Today     TREATMENT MINUTES COMMENTS   Evaluation "     Self-care/ Home management     Manual therapy 8 Patient positioned in supine- TPR to R lateral proximal quad and R lateral gastroc   Neuromuscular Re-education     Therapeutic Activity     Therapeutic Exercises 16 See flow sheet   Gait training     Modality__________________                Total 24    Blank areas are intentional and mean the treatment did not include these items.       Romelia Chau  10/5/2017

## 2021-06-13 NOTE — PROGRESS NOTES
[facility] Daily Progress     Patient Name: Jennifer Alexander  Date: 2017  Visit #: 7  Referral Diagnosis: Lumbar spine DDD  Referring provider: Dr. Quentin Marshall  Visit Diagnosis:     ICD-10-CM    1. Chronic bilateral low back pain without sciatica M54.5     G89.29    2. Chronic right hip pain M25.551     G89.29          Assessment:   Patient reporting positive improvement from the R hip joint mobilization and correction of position in exercise class. PT performed re-test on the lumbar MedX as patient is demonstrating 20# improvement from average strength and improvement in muscle balance.  HEP/POC compliance is  good .  Patient is appropriate to continue with skilled physical therapy intervention, as indicated by initial plan of care.    Goal Status:  Pt. will be independent with home exercise program in : 4 weeks  Pt will: decrease MARION by 30% in 8 weeks  Pt will: increase lumbar extension strength by 30# in 10 weeks    Plan / Patient Education:     Continue with initial plan of care.  Progress with home program as tolerated.    PLAN for next visit: Bridge  Subjective:     Pain Ratin in the hip   After she left here last session, She had here yoga and pilates class combo and adjusted her position which decreased pain a lot. She used her alternative positions and it was great.   Also since the last visit,   Objective:   Patient is currently in the fourth week of the MedX machine.  PT educated the patient on the dosage of the IBP taking one dose of 200 mg just once is not much at all to benefit her.    Patient asks what type of results she should be experiencing at this point because she is not sure she is seeing any. PT explained changing manual treatment and patient did note some improvement from the joint mobilization versus the STM. PT educated the patient on using   Exercises: see flow sheet  Exercise #1: kneeling hip flexor stretch x 30 seconds/leg  Comment #1: Seated piriformis stretch x  "30 seconds/leg  Exercise #2: Nustep x 5 minutes  Comment #2: Rotary Torso 90\" 30# started to the L  Exercise #3: Bridge    Lumbar MedX Initial testing (9/21/17 4-week Re-test (11/2/17)   AROM (full=  0-72  lumbar) 0-66  0-66   Max Extension Torque  100# 105#   Average Extension Torque 73# 91#   Flex: ext ratio (ideal 1.4:1) 2.33:1 1.07:1       Treatment Today     TREATMENT MINUTES COMMENTS   Evaluation     Self-care/ Home management     Manual therapy 12 Patient positioned in supine- R hip join mobilization 1) distraction with belt in hooklying and 90 degrees 2) MWM IR and ER   Patient positioned in prone- 1) Anterior glide to R hip in neutral and figure 4 2) caudal   Neuromuscular Re-education     Therapeutic Activity     Therapeutic Exercises 13 See flow sheet  Re-test performed   Gait training     Modality__________________                Total 25    Blank areas are intentional and mean the treatment did not include these items.       Romelia Chau  11/2/2017    "

## 2021-06-13 NOTE — PROGRESS NOTES
[facility]   [facility]-Pelvic Initial Evaluation    Patient Name: Jennifer Alexander  Date of evaluation: 9/21/2017  Referral Diagnosis: Lumbar spine pain  Referring provider: Quentin Marshall DO  Visit Diagnosis:     ICD-10-CM    1. Chronic bilateral low back pain without sciatica M54.5     G89.29    2. Chronic right hip pain M25.551     G89.29        Assessment:    Patient is presenting to Chronic LBP and R hip pain since 2015. She denies any history of trauma or injury. Her LBP limits her ability with certain movements in her exercise classes. Upon evaluation, Patient is demonstrating increased tone in R illiacus, lateral quadricep, positive Aston and well below average lumbar extension strength for the normative data. PT to address deficits with home exercise program, MedX, and manual therapy.     Pt. is appropriate for skilled PT intervention as outlined in the Plan of Care (POC).  Pt. is a good candidate for skilled PT services to improve pain levels and function.    Goals:  Pt. will be independent with home exercise program in : 4 weeks  Pt will: decrease MARION by 30% in 8 weeks  Pt will: increase lumbar extension strength by 30# in 10 weeks    Patient's expectations/goals are realistic.    Barriers to Learning or Achieving Goals:  Chronicity of the problem.       Plan / Patient Instructions:        Plan of Care:   Communication with: Referral Source  Patient Related Instruction: Nature of Condition;Basis of treatment;Posture  Times per Week: 1  Number of Weeks: 12  Number of Visits: 12  Therapeutic Exercise: Stretching;Strengthening  Neuromuscular Reeducation: posture  Manual Therapy: joint mobilization;myofascial release    Plan for next visit: assess response to manual therapy, dynamic exercise     Subjective:         History of Present Illness:    Jennifer is a 52 y.o. female who presents to therapy today with complaints of Lumbar DDD pain and chronic R hip pain. She is noting some improvement with  the past treatment but she is still aware that she has a difference on the R side. She is limited in her swimming tolerance and has since stopped. She also notes discomfort with some abdominal activation and LE movements. Also with her full bladder, she can re-produce her LBP. She completes pelvic floor 4-5 times a week  She denies history of tingling or numbness and no history of trauma.    Current exercise: Pilates class    Current treatment: heat, salon paws    Past treatments: flexeril, PT with [facility] (Summer 2016- 2016), massage therapy    Pertinent medical history    Pain Ratin  Pain rating at best: 0--> comfortable  Pain rating at worst: 10  Pain description: sharp and shooting    Functional limitations are described as occurring with:   bowel/bladder conrol  sports or recreation exercise classes    Patient reports benefit from:  movement or exercise , heat, physical therapy         Objective:      Note: Items left blank indicates the item was not performed or not indicated at the time of the evaluation.    Patient Outcome Measures :    Modified Oswestry Low Back Pain Disablity Questionnaire  in %: 28   Scores range from 0-100%, where a score of 0% represents minimal pain and maximal function. The minimal clinically important difference is a score reduction of 12%.    Examination  1. Chronic bilateral low back pain without sciatica     2. Chronic right hip pain       Involved side: Right  Posture Observation:      General sitting posture is  fair.  General standing posture is normal.    Lumbar ROM:    Date: 17     *Indicate scale AROM AROM AROM   Lumbar Flexion 0 cm finger tip to floor, no pain     Lumbar Extension Min to moderate loss no pain, but may be more limited due to hip flexor tightness      Right Left Right Left Right Left   Lumbar Sidebending No loss, pain on R No loss       Lumbar Rotation           Lower Extremity Strength:     Date: 17     LE strength/5 Right Left Right  Left Right Left   Hip Flexion (L1-3) 4+ 4+       Hip Extension (L5-S1)         Hip Abduction (L4-5) 3+ 3+       Hip Adduction (L2-3) 4+ 4+       Hip External Rotation         Hip Internal Rotation         Knee Extension (L3-4) 5 5       Knee Flexion 5 5       Ankle Dorsiflexion (L4-5) 5 5       Great Toe Extension (L5)         Ankle Plantar flexion (S1)         Abdominals        Sensation   Equal and normal B       Reflex Testing  Lumbar Dermatomes Right Left UE Reflexes Right Left   Iliac Crest and Groin (L1)   Biceps (C5-6)     Anterior Medial Thigh (L2)   Brachioradialis (C5-6)     Anterior Thigh, Medial Epicondyle Femur (L3)   Triceps (C7-8)     Lateral Thigh, Anterior Knee, Medial Leg/Malleolus (L4)   Sonia s test - -   Lateral Leg, Dorsal Foot (L5)   LE Reflexes     Lateral Foot (S1)   Patellar (L3-4)     Posterior Leg (S2)   Achilles (S1-2)     Other:   Babinski Response       Palpation: R Glut Med and superior Gluts, R illiacus and lateral quadricep    Lumbar Special Tests:     Lumbar Special Tests Right Left SI Tests Right  Left   Quadrant test   SI Compression     Straight leg raise 80 80 SI Distraction     Crossover response - - POSH Test + -   Slump   Sacral Thrust     Sit-up test  FADIR     Trunk extensor endurance test  Resisted Abduction     Prone instability test  Other:PATITO - -   Pubic shotgun  Other:       Repeated Motion Testing:  Does not peripheralize    Passive Mobility - Joint Integrity:  Hypomobile R hip    LE Screen:  Decreased capsular mobility    Treatment Today     TREATMENT MINUTES COMMENTS   Evaluation 30 Low complexity   Self-care/ Home management     Manual therapy 10 Patient positioned in supine- TPR to R illiacus, lateral quadricep   Neuromuscular Re-education     Therapeutic Activity     Therapeutic Exercises 20 See flowsheet, MedX tested this visit   Gait training     Modality__________________                Total 60    Blank areas are intentional and mean the treatment did  not include these items.       PT Evaluation Code: (Please list factors)  Patient History/Comorbidities: none  Examination: lumbopelvis  Clinical Presentation: stable  Clinical Decision Making: low    Patient History/  Comorbidities Examination  (body structures and functions, activity limitations, and/or participation restrictions) Clinical Presentation Clinical Decision Making (Complexity)   No documented Comorbidities or personal factors 1-2 Elements Stable and/or uncomplicated Low   1-2 documented comorbidities or personal factor 3 Elements Evolving clinical presentation with changing characteristics Moderate   3-4 documented comorbidities or personal factors 4 or more Unstable and unpredictable High                Romelia Chau  9/21/2017  10:36 AM

## 2021-06-14 NOTE — PROGRESS NOTES
"[facility] Daily Progress     Patient Name: Jennifer Alexander  Date: 2017  Visit #: 13  Referral Diagnosis: Lumbar spine DDD  Referring provider: Dr. Quentin Marshall  Visit Diagnosis:     ICD-10-CM    1. Chronic bilateral low back pain without sciatica M54.5     G89.29    2. Chronic right hip pain M25.551     G89.29          Assessment:   Patient is currently in the 10th week of the MedX program and tolerating appropriately. She would like to focus on any other exercises she can do independently as she will be stopping therapy at the first of the year.    HEP/POC compliance is  good .  Patient is appropriate to continue with skilled physical therapy intervention, as indicated by initial plan of care.    Goal Status:  Pt. will be independent with home exercise program in : 4 weeks  Pt will: decrease MARION by 30% in 8 weeks  Pt will: increase lumbar extension strength by 30# in 10 weeks    Plan / Patient Education:     Continue with initial plan of care.  Progress with home program as tolerated.    PLAN for next visit: progress core and revisit the dead bug  Subjective:     Pain Ratin in the hip with certain motions.     Patient is noting some peaks and valleys in her days. But otherwise is doing well  Objective:   Patient is currently in the 10th week of the MedX machine.  Patient educated that she may need to build her duration with elliptical if wanting to continue working on it.    Patient asks what type of results she should be experiencing at this point because she is not sure she is seeing any. PT explained changing manual treatment and patient did note some improvement from the joint mobilization versus the STM. PT educated the patient on using   Exercises: see flow sheet  Exercise #1: kneeling hip flexor stretch x 30 seconds/leg  Comment #1: Seated piriformis stretch x 30 seconds/leg  Exercise #2: Nustep x 0 minutes--> treadmill x 5 minutes  Comment #2: Rotary Torso 90\" 40# started to the " R  Exercise #3: Bridge x 0  Comment #3: Clamshell x 10 B  Exercise #4: seated hip hinges x 10    Lumbar MedX Initial testing (9/21/17 4-week Re-test (11/2/17) 8-week Re-test (11/28/17)   AROM (full=  0-72  lumbar) 0-66  0-66 0-66   Max Extension Torque  100# 105# 139#   Average Extension Torque 73# 91# 116#   Flex: ext ratio (ideal 1.4:1) 2.33:1 1.07:1 1.24:1       Treatment Today     TREATMENT MINUTES COMMENTS   Evaluation     Self-care/ Home management     Manual therapy 10 Patient positioned in supine- R hip join mobilization 1) distraction with belt in hooklying and 90 degrees 2) MWM IR and ER, TPR to R anterior hip (not performed this session)  Patient positioned in prone- 1) Anterior glide to R hip in neutral and figure 4 2) caudal 3) MET in ER and IR   Neuromuscular Re-education     Therapeutic Activity     Therapeutic Exercises 15 See flow sheet     Gait training     Modality__________________                Total 25    Blank areas are intentional and mean the treatment did not include these items.       Romelia Chau  12/14/2017

## 2021-06-14 NOTE — PROGRESS NOTES
"[facility] Daily Progress     Patient Name: Jennifer Alexander  Date: 2017  Visit #: 14  Referral Diagnosis: Lumbar spine DDD  Referring provider: Dr. Quentin Marshall  Visit Diagnosis:     ICD-10-CM    1. Chronic bilateral low back pain without sciatica M54.5     G89.29    2. Chronic right hip pain M25.551     G89.29          Assessment:   Patient is currently in the 11th week of the MedX program and tolerating appropriately. She would like to focus on any other exercises she can do independently as she will be stopping therapy at the first of the year.    HEP/POC compliance is  good .  Patient is appropriate to continue with skilled physical therapy intervention, as indicated by initial plan of care.    Goal Status:  No Data Recorded  No Data Recorded    Plan / Patient Education:     Continue with initial plan of care.  Progress with home program as tolerated.    PLAN for next visit: progress core and revisit the dead bug  Subjective:     Pain Ratin in the hip with certain motions.     Patient has had some pain with no pattern. She has her days.  She continues to feel great following the manual therapy. She had ot patch since the last visit and she has not had to do that for awhile  Objective:   Patient is currently in the 11th week of the MedX machine.  Patient educated that she may need to build her duration with elliptical if wanting to continue working on it.    Patient asks what type of results she should be experiencing at this point because she is not sure she is seeing any. PT explained changing manual treatment and patient did note some improvement from the joint mobilization versus the STM. PT educated the patient on using   Exercises: see flow sheet  Exercise #1: kneeling hip flexor stretch x 30 seconds/leg  Comment #1: Seated piriformis stretch x 30 seconds/leg  Exercise #2: Nustep x 5 minutes  Comment #2: Rotary Torso 90\" 40# started to the L  Exercise #3: Bridge x 0  Comment #3: " Clamshell x 10 B  Exercise #4: seated hip hinges x 10    Lumbar MedX Initial testing (9/21/17 4-week Re-test (11/2/17) 8-week Re-test (11/28/17)   AROM (full=  0-72  lumbar) 0-66  0-66 0-66   Max Extension Torque  100# 105# 139#   Average Extension Torque 73# 91# 116#   Flex: ext ratio (ideal 1.4:1) 2.33:1 1.07:1 1.24:1       Treatment Today     TREATMENT MINUTES COMMENTS   Evaluation     Self-care/ Home management     Manual therapy 10 Patient positioned in supine- R hip join mobilization 1) distraction with belt in hooklying and 90 degrees 2) MWM IR and ER, TPR to R anterior hip (not performed this session)  Patient positioned in prone- 1) Anterior glide to R hip in neutral and figure 4 2) caudal 3) MET in ER and IR   Neuromuscular Re-education     Therapeutic Activity     Therapeutic Exercises 18 See flow sheet  Patient with some pausing on the rotary torso this visit but denies pain following.   Gait training     Modality__________________                Total 28    Blank areas are intentional and mean the treatment did not include these items.       Romelia Chau  12/21/2017

## 2021-06-14 NOTE — PROGRESS NOTES
[facility] Daily Progress     Patient Name: Jennifer Alexander  Date: 2017  Visit #: 8  Referral Diagnosis: Lumbar spine DDD  Referring provider: Dr. Quentin Marshall  Visit Diagnosis:     ICD-10-CM    1. Chronic bilateral low back pain without sciatica M54.5     G89.29    2. Chronic right hip pain M25.551     G89.29          Assessment:   Patient continues to note positive improvement from the R hip joint mobilization and correction of position in exercise class. Patient continues with reduced pain level and toleration for hip mobilization. PT to continue with therapeutic exercise and manual therapy as patient continues to fund this effective.  HEP/POC compliance is  good .  Patient is appropriate to continue with skilled physical therapy intervention, as indicated by initial plan of care.    Goal Status:  Pt. will be independent with home exercise program in : 4 weeks  Pt will: decrease MARION by 30% in 8 weeks  Pt will: increase lumbar extension strength by 30# in 10 weeks    Plan / Patient Education:     Continue with initial plan of care.  Progress with home program as tolerated.    PLAN for next visit: progress core and revisit the dead bug  Subjective:     Pain Ratin in the hip   She is noting great response to the manual hip mobilization. She did have some pain this morning but no reports pain currently.  Objective:   Patient is currently in the fifth week of the MedX machine.  PT educated the patient on the dosage of the IBP taking one dose of 200 mg just once is not much at all to benefit her.    Patient asks what type of results she should be experiencing at this point because she is not sure she is seeing any. PT explained changing manual treatment and patient did note some improvement from the joint mobilization versus the STM. PT educated the patient on using   Exercises: see flow sheet  Exercise #1: kneeling hip flexor stretch x 30 seconds/leg  Comment #1: Seated piriformis stretch x 30  "seconds/leg  Exercise #2: Nustep x 5 minutes  Comment #2: Rotary Torso 90\" 30# started to the L  Exercise #3: Bridge    Lumbar MedX Initial testing (9/21/17 4-week Re-test (11/2/17)   AROM (full=  0-72  lumbar) 0-66  0-66   Max Extension Torque  100# 105#   Average Extension Torque 73# 91#   Flex: ext ratio (ideal 1.4:1) 2.33:1 1.07:1       Treatment Today     TREATMENT MINUTES COMMENTS   Evaluation     Self-care/ Home management     Manual therapy 10 Patient positioned in supine- R hip join mobilization 1) distraction with belt in hooklying and 90 degrees 2) MWM IR and ER   Patient positioned in prone- 1) Anterior glide to R hip in neutral and figure 4 2) caudal   Neuromuscular Re-education     Therapeutic Activity     Therapeutic Exercises 15 See flow sheet     Gait training     Modality__________________                Total 25    Blank areas are intentional and mean the treatment did not include these items.       Romelia Chau  11/9/2017    "

## 2021-06-14 NOTE — PROGRESS NOTES
[facility] Daily Progress     Patient Name: Jennifer Alexander  Date: 2017  Visit #: 10  Referral Diagnosis: Lumbar spine DDD  Referring provider: Dr. Quentin Marshall  Visit Diagnosis:     ICD-10-CM    1. Chronic bilateral low back pain without sciatica M54.5     G89.29    2. Chronic right hip pain M25.551     G89.29          Assessment:   Patient continues to note positive improvement from the R hip joint mobilization and correction of position in exercise class. Patient continues with reduced pain level and toleration for hip mobilization. PT to continue with therapeutic exercise and manual therapy as patient continues to find this effective.  Patient is currently in the 7th week of the med X program and due for re-test next session.  HEP/POC compliance is  good .  Patient is appropriate to continue with skilled physical therapy intervention, as indicated by initial plan of care.    Goal Status:  Pt. will be independent with home exercise program in : 4 weeks  Pt will: decrease MARION by 30% in 8 weeks  Pt will: increase lumbar extension strength by 30# in 10 weeks    Plan / Patient Education:     Continue with initial plan of care.  Progress with home program as tolerated.    PLAN for next visit: progress core and revisit the dead bug  Subjective:     Pain Ratin-3 in the hip with certain motions.     Patient is maintaining pain level well.No new questions or concerns this visit.  Objective:   Patient is currently in the seventh week of the MedX machine.  PT educated the patient on the dosage of the IBP taking one dose of 200 mg just once is not much at all to benefit her.    Patient asks what type of results she should be experiencing at this point because she is not sure she is seeing any. PT explained changing manual treatment and patient did note some improvement from the joint mobilization versus the STM. PT educated the patient on using   Exercises: see flow sheet  Exercise #1: kneeling hip  "flexor stretch x 30 seconds/leg  Comment #1: Seated piriformis stretch x 30 seconds/leg  Exercise #2: Nustep x 5 minutes  Comment #2: Rotary Torso 90\" 34# started to the L  Exercise #3: Bridge x 0    Lumbar MedX Initial testing (9/21/17 4-week Re-test (11/2/17)   AROM (full=  0-72  lumbar) 0-66  0-66   Max Extension Torque  100# 105#   Average Extension Torque 73# 91#   Flex: ext ratio (ideal 1.4:1) 2.33:1 1.07:1       Treatment Today     TREATMENT MINUTES COMMENTS   Evaluation     Self-care/ Home management     Manual therapy 14 Patient positioned in supine- R hip join mobilization 1) distraction with belt in hooklying and 90 degrees 2) MWM IR and ER   Patient positioned in prone- 1) Anterior glide to R hip in neutral and figure 4 2) caudal   Neuromuscular Re-education     Therapeutic Activity     Therapeutic Exercises 13 See flow sheet     Gait training     Modality__________________                Total 27    Blank areas are intentional and mean the treatment did not include these items.       Romelia Chau  11/21/2017    "

## 2021-06-14 NOTE — PROGRESS NOTES
[facility] Daily Progress     Patient Name: Jennifer Alexander  Date: 2017  Visit #: 9  Referral Diagnosis: Lumbar spine DDD  Referring provider: Dr. Quentin Marshall  Visit Diagnosis:     ICD-10-CM    1. Chronic bilateral low back pain without sciatica M54.5     G89.29    2. Chronic right hip pain M25.551     G89.29          Assessment:   Patient continues to note positive improvement from the R hip joint mobilization and correction of position in exercise class. Patient continues with reduced pain level and toleration for hip mobilization. PT to continue with therapeutic exercise and manual therapy as patient continues to find this effective.  HEP/POC compliance is  good .  Patient is appropriate to continue with skilled physical therapy intervention, as indicated by initial plan of care.    Goal Status:  Pt. will be independent with home exercise program in : 4 weeks  Pt will: decrease MARION by 30% in 8 weeks  Pt will: increase lumbar extension strength by 30# in 10 weeks    Plan / Patient Education:     Continue with initial plan of care.  Progress with home program as tolerated.    PLAN for next visit: progress core and revisit the dead bug  Subjective:     Pain Ratin-3 in the hip with certain motions.  She is noting her symptoms to be stabilized since the last visit. She continues to note positive improvement following the manual therapy.     Objective:   Patient is currently in the sixth week of the MedX machine.  PT educated the patient on the dosage of the IBP taking one dose of 200 mg just once is not much at all to benefit her.    Patient asks what type of results she should be experiencing at this point because she is not sure she is seeing any. PT explained changing manual treatment and patient did note some improvement from the joint mobilization versus the STM. PT educated the patient on using   Exercises: see flow sheet  Exercise #1: kneeling hip flexor stretch x 30  "seconds/leg  Comment #1: Seated piriformis stretch x 30 seconds/leg  Exercise #2: Nustep x 5 minutes  Comment #2: Rotary Torso 90\" 34# started to the L  Exercise #3: Bridge x 0    Lumbar MedX Initial testing (9/21/17 4-week Re-test (11/2/17)   AROM (full=  0-72  lumbar) 0-66  0-66   Max Extension Torque  100# 105#   Average Extension Torque 73# 91#   Flex: ext ratio (ideal 1.4:1) 2.33:1 1.07:1       Treatment Today     TREATMENT MINUTES COMMENTS   Evaluation     Self-care/ Home management     Manual therapy 12 Patient positioned in supine- R hip join mobilization 1) distraction with belt in hooklying and 90 degrees 2) MWM IR and ER   Patient positioned in prone- 1) Anterior glide to R hip in neutral and figure 4 2) caudal   Neuromuscular Re-education     Therapeutic Activity     Therapeutic Exercises 13 See flow sheet     Gait training     Modality__________________                Total 25    Blank areas are intentional and mean the treatment did not include these items.       Romelia Chau  11/16/2017    "

## 2021-06-14 NOTE — PROGRESS NOTES
[facility] Daily Progress     Patient Name: Jennifer Alexander  Date: 12/7/2017  Visit #: 12  Referral Diagnosis: Lumbar spine DDD  Referring provider: Dr. Quentin Marshall  Visit Diagnosis:     ICD-10-CM    1. Chronic bilateral low back pain without sciatica M54.5     G89.29    2. Chronic right hip pain M25.551     G89.29          Assessment:   Patient is currently in the 9th week of the MedX program and tolerating appropriately. She would like to focus on any other exercises she can do independently as she will be stopping therapy at the first of the year.    HEP/POC compliance is  good .  Patient is appropriate to continue with skilled physical therapy intervention, as indicated by initial plan of care.    Goal Status:  Pt. will be independent with home exercise program in : 4 weeks  Pt will: decrease MARION by 30% in 8 weeks  Pt will: increase lumbar extension strength by 30# in 10 weeks    Plan / Patient Education:     Continue with initial plan of care.  Progress with home program as tolerated.    PLAN for next visit: progress core and revisit the dead bug, hip hinge  Subjective:     Pain Rating: 3-4 in the hip with certain motions.     Patient has not been using the heat for the past two days. She was limping a little bit.  She does not think the weather has any affect on her joints when PT attempted to educate  Objective:   Patient is currently in the eighth week of the MedX machine.  Patient educated that she may need to build her duration with elliptical if wanting to continue working on it.    Patient asks what type of results she should be experiencing at this point because she is not sure she is seeing any. PT explained changing manual treatment and patient did note some improvement from the joint mobilization versus the STM. PT educated the patient on using   Exercises: see flow sheet  Exercise #1: kneeling hip flexor stretch x 30 seconds/leg  Comment #1: Seated piriformis stretch x 30  "seconds/leg  Exercise #2: Nustep x 6 minutes  Comment #2: Rotary Torso 90\" 38# started to the L  Exercise #3: Bridge x 0    Lumbar MedX Initial testing (9/21/17 4-week Re-test (11/2/17) 8-week Re-test (11/28/17)   AROM (full=  0-72  lumbar) 0-66  0-66 0-66   Max Extension Torque  100# 105# 139#   Average Extension Torque 73# 91# 116#   Flex: ext ratio (ideal 1.4:1) 2.33:1 1.07:1 1.24:1       Treatment Today     TREATMENT MINUTES COMMENTS   Evaluation     Self-care/ Home management     Manual therapy 10 Patient positioned in supine- R hip join mobilization 1) distraction with belt in hooklying and 90 degrees 2) MWM IR and ER, TPR to R anterior hip  Patient positioned in prone- 1) Anterior glide to R hip in neutral and figure 4 2) caudal 3) MET in ER and IR   Neuromuscular Re-education     Therapeutic Activity     Therapeutic Exercises 20 See flow sheet     Gait training     Modality__________________                Total 30    Blank areas are intentional and mean the treatment did not include these items.       Romelia Chau  12/7/2017    "

## 2021-06-14 NOTE — PROGRESS NOTES
[facility] Daily Progress     Patient Name: Jennifer Alexander  Date: 2017  Visit #: 11  Referral Diagnosis: Lumbar spine DDD  Referring provider: Dr. Quentin Marshall  Visit Diagnosis:     ICD-10-CM    1. Chronic bilateral low back pain without sciatica M54.5     G89.29    2. Chronic right hip pain M25.551     G89.29          Assessment:   Patient is currently in the 8th week of the MedX program and demonstrating significant improvement in isometric strength from 100# to 139#. She is still testing below the normative data for strength and continues to benefit from further strengthening. Her symptoms are holding stable.  HEP/POC compliance is  good .  Patient is appropriate to continue with skilled physical therapy intervention, as indicated by initial plan of care.    Goal Status:  Pt. will be independent with home exercise program in : 4 weeks  Pt will: decrease MARION by 30% in 8 weeks  Pt will: increase lumbar extension strength by 30# in 10 weeks    Plan / Patient Education:     Continue with initial plan of care.  Progress with home program as tolerated.    PLAN for next visit: progress core and revisit the dead bug  Subjective:     Pain Ratin-3 in the hip with certain motions.     Patient tolerated her plane ride home but did require patch on the trip out there. She has been decreasing use of her patches for pain. Patient noted some increase in pain after elliptical for 10 minutes while she was in [place].   Objective:   Patient is currently in the eighth week of the MedX machine.  Patient educated that she may need to build her duration with elliptical if wanting to continue working on it.    Patient asks what type of results she should be experiencing at this point because she is not sure she is seeing any. PT explained changing manual treatment and patient did note some improvement from the joint mobilization versus the STM. PT educated the patient on using   Exercises: see flow  "sheet  Exercise #1: kneeling hip flexor stretch x 30 seconds/leg  Comment #1: Seated piriformis stretch x 30 seconds/leg  Exercise #2: Nustep x 5 minutes  Comment #2: Rotary Torso 90\" 36# started to the R  Exercise #3: Bridge x 0    Lumbar MedX Initial testing (9/21/17 4-week Re-test (11/2/17) 8-week Re-test (11/28/17   AROM (full=  0-72  lumbar) 0-66  0-66 0-66   Max Extension Torque  100# 105# 139#   Average Extension Torque 73# 91# 116#   Flex: ext ratio (ideal 1.4:1) 2.33:1 1.07:1 1.24:1       Treatment Today     TREATMENT MINUTES COMMENTS   Evaluation     Self-care/ Home management     Manual therapy 10 Patient positioned in supine- R hip join mobilization 1) distraction with belt in hooklying and 90 degrees 2) MWM IR and ER (not performed this session)  Patient positioned in prone- 1) Anterior glide to R hip in neutral and figure 4 2) caudal 3) MET in ER and IR   Neuromuscular Re-education     Therapeutic Activity     Therapeutic Exercises 20 See flow sheet  Re-test performed this visit   Gait training     Modality__________________                Total 30    Blank areas are intentional and mean the treatment did not include these items.       Romelia Chau  11/28/2017    "

## 2021-06-15 NOTE — PROGRESS NOTES
Assessment/Plan:        Diagnoses and associated orders for this visit:    1. Nodule of finger of right hand  Will obtain xray of right hand today and update patient with results to treat accordingly.   Patient agreed and appeared pleased with plan    - XR Hand Right 2 VWS; Future      2. Essential hypertension  Hx of Hypokalemia  Well controlled today,  Continue with HCTZ 25mg once per day, 1 tablet in the morning and 1/2 tablet in PM  She does have history of hypokalemia, she does take 5 tabs of potassium for 4 years with normal to low potassium levels.   She is keeping a BP log and within normal limits.   Reinforced  lifestyle changes, decreasing salt intake to no more than 2 grams per day and increasing exercise.  Follow up for 6 month blood pressure check.  Patient agreed with plan!      - hydrochlorothiazide (HYDRODIURIL) 25 MG tablet; Take 1 tablet (25 mg total) by mouth daily.  Dispense: 90 tablet; Refill: 3       3. Leukopenia hx  Patient seen Dr. Luque in Hem/Onc who suggested to complete follow up lab work.  Labwork was reviewed with patient along with notes from Dr. Luque  WBC results WNL. Stable at this time  Will continue to monitor at annual physicals.   Patient agreed and appeared pleased with plan    4. Eczema- behind bilateral knees  Recommend Cera-V lotion application to site. Recommended OTC hydrocortisone cream as well.   If no improvement, would recommend topical steroid cream, triamcinolone cream,  instead.   Patient would like to try OTC lotions at this time.   Discussed with patient to follow up if worsening symptoms, questions or concerns  Patient agreed and appeared pleased with plan  Office visit and charting completed with Student NP, Hortensia You      HPI:  Jennifer Alexander is a 52 y.o. female who presents today for blood pressure check.     History of hypertension,   Well controlled with HCTZ. She is taking 1 full tablet of 25mg in the morning and 1/2 tablet in the afternoon.   She is  monitoring her blood pressure and logging it and within normal range.   Denies any chest pain, racing skipped heartbeat, palpitations, swelling in LE.   Denies any acute concerns.       Behind both knees: Complaints of itch to both areas. Some patches of dry skin.   She does try to limit scratching but will often wake up with scratch marks, which she scratches during sleep.   Denies any redness,  warmth, tenderness or swelling to areas. She has used Neosporin to area which seemed to help. Along with using baby lotion and baby oil.    She started noticing this within the last month. Denies past history of eczema or similar rash.     She has another concern, she has noticed small lumps on her right hand fingers, joints.  Noticed swelling to right middle finger for 5+ years with nailbed deformity.   But noticed swelling, lump to 1st pointer finger of right hand within the last year.   Denies stiffness, redness, warmth or pain to site. Denies ache. Denies family hx of RA.   Denies history of arthritis.       Reviewed and updated below  Allergies   Allergen Reactions     Hydrocodone Shortness Of Breath and Nausea And Vomiting     Tetracycline Nausea And Vomiting     Current Outpatient Prescriptions   Medication Sig Dispense Refill     aspirin 81 mg chewable tablet Chew 81 mg. Twice per week       ciclopirox (PENLAC) 8 % solution Apply over nail and surrounding skin. Apply daily over previous coat. After seven (7) days, may remove with alcohol and continue cycle. 6.6 mL 0     eflornithine (VANIQA) 13.9 % cream Apply as needed 30 g 0     hydroCHLOROthiazide (HYDRODIURIL) 25 MG tablet Take 1 tablet in the AM and 1/2 tablet in the afternoon 135 tablet 1     multivitamin therapeutic (THERAGRAN) tablet Take 1 tablet by mouth daily.       potassium chloride SA (KLOR-CON M20) 20 MEQ tablet Take 1 tablet (20 mEq total) by mouth 5 (five) times a day. 450 tablet 3     No current facility-administered medications for this visit.       Past Medical History:   Diagnosis Date     Hypertension      Past Surgical History:   Procedure Laterality Date     HYSTERECTOMY      supracervical 2010     OOPHORECTOMY      1 removed, 1 remains     Social History     Social History     Marital status: Single     Spouse name: N/A     Number of children: 0     Years of education: N/A     Occupational History     [employer]     Social History Main Topics     Smoking status: Never Smoker     Smokeless tobacco: Never Used     Alcohol use No     Drug use: No     Sexual activity: No     Other Topics Concern     None     Social History Narrative     Family History   Problem Relation Age of Onset     Hypertension Mother      Hypertension Father      Stroke Maternal Grandfather      Review of Systems   Constitutional: Negative.   HENT: Negative.   Eyes: Negative.   Respiratory: Negative.   Cardiovascular: Hx of hypertension  Gastrointestinal: Negative.   Endocrine: Negative.   Genitourinary: Negative.   Musculoskeletal: Negative.   Skin: rash behind knees. Lumps on hands  Allergic/Immunologic: Negative.   Neurological: Negative.   Hematological: Negative.   Psychiatric/Behavioral: Negative.       Objective:     Physical Exam   /70  Pulse 64  Temp 98.1  F (36.7  C) (Oral)   Resp 18  Wt 130 lb (59 kg)  LMP 07/28/2010  BMI 23.78 kg/m2    Constitutional: Alert and oriented x3, well nourished without any acute distress  Cardiovascular: Normal S1 and S2. Regular rate, rhythm and no murmur, rubs or gallops. Palpable distal pulses bilaterally.  Pulmonary/Chest: Normal effort. Lungs clear to auscultation in all lobes. Without wheezing, rhonchi or crackles.    Abdominal: Soft, non tenderness. There is no rebound or guarding. Bowel sounds x4. Without organomegaly. No CVA tenderness.  Musculoskeletal: 5/5 strength  And full ROM in all extremities. No joint swelling or deformity in lower limbs. Nodule present to right hand 1st finger medial side,  along with anterior 2nd finger near nailbed with nailbed deformity noted to 2nd finger only. No warmth to area. No tenderness to palpation. Full ROM to hands.   Neurological: Cranial nerves intact, without deficit. Without numbness, tingling or paresthesia, redness, warmth, swelling. Normal reflexes  Skin: Dry and intact; without rashes or lesions.   Psychiatric: Normal mood and affect.

## 2021-06-15 NOTE — CONSULTS
[facility] Hematology and Oncology Consult Note    Patient: Jennifer Alexander  MRN: [id number]  Date of Service: 01/04/2018      Reason for Visit    Referred by Sosa Loya NP regarding leukopenia.    Assessment/Plan    ECOG Performance   ECOG Performance Status: 0  Distress Assessment  Distress Assessment Score: No distress    #.  Mild leukopenia and history of mild neutropenia   I reviewed her hemogram 3 of them available to review today.  I explained the possible etiologies of mild leukopenia and neutropenia including drug-induced, chronic idiopathic neutropenia, autoimmune disease, or vitamin deficiency, benign ethnic neutropenia, or primary bone marrow disease.  There is no evidence of vitamin deficiency such as vitamin B12.  She is not on any suspicious medication for neutropenia.  I explained the possibility of chronic idiopathic neutropenia versus benign ethnic neutropenia.  Primary bone marrow disorder is very less likely in the setting of stable total white count, normal hemoglobin and platelets.  There is no evidence of infections.  I explained her that her total neutrophil count of 500-1000 would be sufficient for infection standpoint.  I reviewed the next step in management including repeating hemogram, nephrology to review.  If she has worsening neutropenia, I would request flow cytometry then if there is anything concerning, we could consider a unilateral bone marrow biopsy.  I explained to her that we do not necessarily need to stop any medication at this point from her medication list.  However we will follow-up.     Labs as below.  I will call with the results and next step in care.      Problem List    1. WBC decreased  Reticulocytes    HM1(CBC and Differential)    Morphology,Smear Review (MORP)    HM1 (CBC with Diff)    JIC RED    JIC GRN    Peripheral Blood Smear, Path Review     ______________________________________________________________________________    History    Ms. Jennifer Alexander is a  very pleasant 52-year-old  descent female presented herself today.    She was found to have mild leukopenia in the annual physical in July 2017 with normal hemoglobin and platelets.  It was repeated last month and still in a mildly low at 2.6.  No differentials are's drawn.  Patient brought in previous hemogram report from 2011 which showed WBC 3.3, hemoglobin 14.2, platelet 240 with absolute neutrophil count 1700.  No other blood work are available.  She initially felt that it was due to excessive intake of vitamin B12, she stopped and recheck level was not improved.  She denies any frequent infections.  She has mild hypertension that she is taking diuretics.  She at hysterectomy for fibroids in 2010.  She does not have children.  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  She is a never smoker.  She does not drink alcohol.  She reported that her siblings might have low WBC as well but she does not know for sure.  She does not know about family history of cancer as they lived in a health care under privileged area.    Past History    Past Medical History:   Diagnosis Date     Hypertension     Family History   Problem Relation Age of Onset     Hypertension Mother      Hypertension Father      Stroke Maternal Grandfather       Past Surgical History:   Procedure Laterality Date     HYSTERECTOMY      supracervical 2010     OOPHORECTOMY      1 removed, 1 remains    Social History     Social History     Marital status: Single     Spouse name: N/A     Number of children: 0     Years of education: N/A     Occupational History     [employer]     Social History Main Topics     Smoking status: Never Smoker     Smokeless tobacco: Never Used     Alcohol use No     Drug use: No     Sexual activity: No     Other Topics Concern     Not on file     Social History Narrative        Allergies    Allergies   Allergen Reactions     Hydrocodone Shortness Of  Breath and Nausea And Vomiting     Tetracycline Nausea And Vomiting       Review of Systems  Pain  Currently in Pain: No/denies   Apart from describing in history, the remainder of comprehensive ROS was negative.      Physical Exam    Recent Vitals 1/4/2018   Height -   Weight 130 lbs 8 oz   BSA (m2) -   /63   Pulse 75   Temp -   Temp src -   SpO2 100   Some recent data might be hidden     General: alert, awake, not in acute distress  HEENT: Head: Normal, normocephalic, atraumatic.  Eye: Normal external eye, conjunctiva, lids cornea, ENEIDA.  Ears:Non-tender.  Nose: Normal external nose, mucus membranes and septum.  Pharynx: Dental Hygiene adequate. Normal buccal mucosa. Normal pharynx.  Neck / Thyroid: Supple, no masses, nodes, nodules or enlargement.  Lymphatics: No abnormally enlarged lymph nodes.  Chest: Normal chest wall and respirations. Clear to auscultation.  Heart: S1 S2 RRR, no murmur.   Abdomen: abdomen is soft without significant tenderness, masses, organomegaly or guarding  Extremities: normal strength, tone, and muscle mass  Skin: normal. no rash or abnormalities  CNS: non focal.        Lab Results    Recent Results (from the past 168 hour(s))   Reticulocytes   Result Value Ref Range    Retic Absolute Count 0.039 0.010 - 0.110 mill/uL   Morphology,Smear Review (MORP)   Result Value Ref Range    Pathology, Smear Review See Separate Pathology Report (!) (none)    WBC 5.8 4.0 - 11.0 thou/uL    RBC 4.27 3.80 - 5.40 mill/uL    Hemoglobin 12.4 12.0 - 16.0 g/dL    Hematocrit 35.2 35.0 - 47.0 %    MCV 82 80 - 100 fL    MCH 29.0 27.0 - 34.0 pg    MCHC 35.2 32.0 - 36.0 g/dL    RDW 12.7 11.0 - 14.5 %    Platelets 250 140 - 440 thou/uL    MPV 10.1 8.5 - 12.5 fL    Neutrophils % 68 50 - 70 %    Lymphocytes % 25 20 - 40 %    Monocytes % 6 2 - 10 %    Eosinophils % 1 0 - 6 %    Basophils % 0 0 - 2 %    Neutrophils Absolute 3.9 2.0 - 7.7 thou/uL    Lymphocytes Absolute 1.5 0.8 - 4.4 thou/uL    Monocytes Absolute  0.3 0.0 - 0.9 thou/uL    Eosinophils Absolute 0.1 0.0 - 0.4 thou/uL    Basophils Absolute 0.0 0.0 - 0.2 thou/uL   HM1 (CBC with Diff)   Result Value Ref Range    WBC 5.8 4.0 - 11.0 thou/uL    RBC 4.27 3.80 - 5.40 mill/uL    Hemoglobin 12.4 12.0 - 16.0 g/dL    Hematocrit 35.2 35.0 - 47.0 %    MCV 82 80 - 100 fL    MCH 29.0 27.0 - 34.0 pg    MCHC 35.2 32.0 - 36.0 g/dL    RDW 12.7 11.0 - 14.5 %    Platelets 250 140 - 440 thou/uL    MPV 10.1 8.5 - 12.5 fL    Neutrophils % 68 50 - 70 %    Lymphocytes % 25 20 - 40 %    Monocytes % 6 2 - 10 %    Eosinophils % 1 0 - 6 %    Basophils % 0 0 - 2 %    Neutrophils Absolute 3.9 2.0 - 7.7 thou/uL    Lymphocytes Absolute 1.5 0.8 - 4.4 thou/uL    Monocytes Absolute 0.3 0.0 - 0.9 thou/uL    Eosinophils Absolute 0.1 0.0 - 0.4 thou/uL    Basophils Absolute 0.0 0.0 - 0.2 thou/uL       Imaging Results    No results found.      Signed by: Shruthi Luque MD

## 2021-06-16 PROBLEM — D70.4 CYCLICAL NEUTROPENIA (H): Status: ACTIVE | Noted: 2019-07-16

## 2021-06-17 NOTE — PROGRESS NOTES
[facility] Discharge Summary  Patient Name: Jennifer Alexander  Date: 4/26/2018  Referral Diagnosis: Lumbar spine pain  Referring provider: Dr. Quentin Marshall  Visit Diagnosis:   1. Chronic bilateral low back pain without sciatica     2. Chronic right hip pain         Goals:  See below    Patient was seen for 14 visits from 9/21/17 to 12/21/17  with 0 missed appointments.  The patient met goals and has demonstrated understanding of and independence in the home program for self-care, and progression to next steps.  She will initiate contact if questions or concerns arise.    Therapy will be discontinued at this time.  The patient will need a new referral to resume.    Thank you for your referral.  Romelia Chau  4/26/2018  10:41 AM

## 2021-06-17 NOTE — PATIENT INSTRUCTIONS - HE
Patient Instructions by Yue Zuñiga MD at 7/16/2019  8:20 AM     Author: Yue Zuñiga MD Service: -- Author Type: Physician    Filed: 7/16/2019  9:01 AM Encounter Date: 7/16/2019 Status: Signed    : Yue Zuñiga MD (Physician)         Patient Education     Prevention Guidelines, Women Ages 50 to 64  Screening tests and vaccines are an important part of managing your health. A screening test is done to find possible disorders or diseases in people who don't have any symptoms. The goal is to find a disease early so lifestyle changes can be made and you can be watched more closely to reduce the risk of disease, or to detect it early enough to treat it most effectively. Screening tests are not considered diagnostic, but are used to determine if more testing is needed. Health counseling is essential, too. Below are guidelines for these, for women ages 50 to 64. Talk with your healthcare provider to make sure youre up to date on what you need.  Screening Who needs it How often   Type 2 diabetes or prediabetes All women beginning at age 45 and women without symptoms at any age who are overweight or obese and have 1 or more additional risk factors for diabetes. At  least every 3 years   Type 2 diabetes or prediabetes All women diagnosed with gestational diabetes Lifelong testing every 3 years   Type 2 diabetes All women with prediabetes Every year   Alcohol misuse All women in this age group At routine exams   Blood pressure All women in this age group Yearly checkup if your blood pressure is normal  Normal blood pressure is less than 120/80 mm Hg  If your blood pressure reading is higher than normal, follow the advice of your healthcare provider   Breast cancer All women at average risk in this age group Yearly mammogram should be done until age 54. At age 55, switch to mammograms every other year or choose to continue yearly mammograms.  All women should be familiar with the potential benefits and risks of  breast cancer screening with mammograms.      Cervical cancer All women in this age group, except women who have had a complete hysterectomy Pap test every 3 years or Pap test with human papillomavirus (HPV) test every 5 years   Chlamydia Women at increased risk for infection At routine exams   Colorectal cancer All women in this age group Flexible sigmoidoscopy every 5 years, or colonoscopy every 10 years, or double-contrast barium enema every 5 years; yearly fecal occult blood test or fecal immunochemical test; or a stool DNA test as often as your health care provider advises; talk with your health care provider about which tests are best for you   Depression All women in this age group At routine exams   Gonorrhea Sexually active women at increased risk for infection At routine exams   Hepatitis C Anyone at increased risk; 1 time for those born between 1945 and 1965 At routine exams   High cholesterol or triglycerides All women in this age group who are at risk for coronary artery disease At least every 5 years   HIV All women At routine exams   Lung cancer Adults age 55 to 80 who have smoked Yearly screening in smokers with 30 pack-year history of smoking or who quit within 15 years   Obesity All women in this age group At routine exams   Osteoporosis Women who are postmenopausal Ask your healthcare provider   Syphilis Women at increased risk for infection - talk with your healthcare provider At routine exams   Tuberculosis Women at increased risk for infection - talk with your healthcare provider Ask your healthcare provider   Vision All women in this age group Ask your healthcare provider   Vaccine Who needs it How often   Chickenpox (varicella) All women in this age group who have no record of this infection or vaccine 2 doses; the second dose should be given at least 4 weeks after the first dose   Hepatitis A Women at increased risk for infection - talk with your healthcare provider 2 doses given at least 6  months apart   Hepatitis B Women at increased risk for infection - talk with your healthcare provider 3 doses over 6 months; second dose should be given 1 month after the first dose; the third dose should be given at least 2 months after the second dose and at least 4 months after the first dose   Haemophilus influenzaeType B (HIB) Women at increased risk for infection - talk with your healthcare provider 1 to 3 doses   Influenza (flu) All women in this age group Once a year   Measles, mumps, rubella (MMR) Women in this age group through their late 50s who have no record of these infections or vaccines 1 dose   Meningococcal Women at increased risk for infection - talk with your healthcare provider 1 or more doses   Pneumococcal conjugate vaccine (PCV13) and pneumococcal polysaccharide vaccine (PPSV23) Women at increased risk for infection - talk with your healthcare provider PCV13: 1 dose ages 19 to 65 (protects against 13 types of pneumococcal bacteria)  PPSV23: 1 to 2 doses through age 64, or 1 dose at 65 or older (protects against 23 types of pneumococcal bacteria)   Tetanus/diphtheria/pertussis (Td/Tdap) booster All women in this age group Td every 10 years, or a one-time dose of Tdap instead of a Td booster after age 18, then Td every 10 years   Zoster All women ages 60 and older 1 dose   Counseling Who needs it How often   BRCA gene mutation testing for breast and ovarian cancer susceptibility Women with increased risk for having gene mutation When your risk is known   Breast cancer and chemoprevention Women at high risk for breast cancer When your risk is known   Diet and exercise Women who are overweight or obese When diagnosed, and then at routine exams   Sexually transmitted infection prevention Women at increased risk for infection - talk with your healthcare provider At routine exams   Use of daily aspirin Women ages 55 and up in this age group who are at risk for cardiovascular health problems such as  stroke When your risk is known   Use of tobacco and the health effects it can cause All women in this age group Every exam   1American [employer]  Date Last Reviewed: 1/26/2016 2000-2017 [employer]. [address] [place] [address], [place] [address]. All rights reserved. This information is not intended as a substitute for professional medical care. Always follow your healthcare professional's instructions.

## 2021-06-19 NOTE — PROGRESS NOTES
"FEMALE PREVENTATIVE EXAM    Assessment and Plan:       1. Essential hypertension  Hx of Hypokalemia  Well controlled today,  Continue without  HCTZ 25mg once per day, currently not taking any medication and well controlled.   She does have history of hypokalemia, she does take 5 tabs of potassium for 4 years with normal to low potassium levels. Will check potassium level and follow up if she needs to continue with supplementation.   She is keeping a BP log and within normal limits.   Reinforced  lifestyle changes, decreasing salt intake to no more than 2 grams per day and increasing exercise.  Follow up for 6 month blood pressure check.  Patient agreed with plan!  Vitals:    07/27/18 0849   BP: 102/62   Patient Site: Left Arm   Patient Position: Sitting   Cuff Size: Adult Regular   Pulse: 72   Weight: 131 lb (59.4 kg)   Height: 5' 2\" (1.575 m)            2. Leukopenia hx  Patient seen Dr. Luque in Hem/Onc who suggested to complete follow up lab work.  Labwork was reviewed with patient along with notes from Dr. Luque  WBC results WNL. Stable at this time  Will continue to monitor at annual physicals.   Patient agreed and appeared pleased with plan     3.Excessive hair growth  Long term medication, will refill today  - eflornithine (VANIQA) 13.9 % cream; Apply as needed  Dispense: 45 g; Refill: 2    4. Toenail fungus  - ciclopirox (PENLAC) 8 % solution; Apply over nail and surrounding skin. Apply daily over previous coat. After seven (7) days, may remove with alcohol and continue cycle.  Dispense: 6.6 mL; Refill: 2    5. Health care maintenance  I did order fasting labs, I will follow up with results once received.   Colonoscopy is up to date  Mammogram ordered  Dexa bone scan ordered.   Pap smear completed today requests annual pap smears, will follow up with lab and pap results once received.   We discussed healthy lifestyle, nutrition, cardiovascular risk reduction, self care, safety, self breast exams, sunscreen, " and seatbelt screening.  Recommended annual physical exam or sooner for any acute concerns.   Jennifer did agree with plan.   - Lipid Cascade  - Vitamin D, Total (25-Hydroxy)  - Comprehensive Metabolic Panel  - Glycosylated Hemoglobin A1c  - Gynecologic Cytology (PAP Smear)  - Mammo Screening Bilateral; Future  - Thyroid Cascade  - HM1(CBC and Differential)  - HM1 (CBC with Diff)  - DXA Bone Density Scan; Future    6. Left wrist pain    Study Result   XR WRIST LEFT NAVICULAR 3 OR MORE VWS  7/27/2018 9:57 AM     INDICATION: Left wrist pain  COMPARISON: None.     FINDINGS: Mild degenerative changes at the base of the thumb. No fracture or dislocation       - XR Wrist Left Navicular 3 or More VWS; Future    7. Bunion, right  - Ambulatory referral to Podiatry    8. Hip pain, right  Chronic condition. Continue follow up and treatment with Dr. Marshall,   [facility].   - Ambulatory referral to Spine Care      Immunization Review  Adult Imm Review: No immunizations due today    I discussed the following with the patient:   Adult Healthy Living: Importance of regular exercise  Healthy nutrition  Stress management  Use of seat belts      Subjective:   Chief Complaint: Jennifer Alexander is an 53 y.o. female here for a preventative health visit.     HPI:     Jennifer Alexander is a 53 y.o. female who presents today for healthcare maintenance.      History of hypertension,   Well controlled without HCTZ. She has stopped taking this medication recently d/t low blood pressures.   She is monitoring her blood pressure and logging daily  Denies any chest pain, racing skipped heartbeat, palpitations, swelling in LE.   Denies any acute concerns.           Requesting left wrist xray, She states in 12/2017 she was pushing snow.   She notes slight pain on eday later, that has continued with activities.  Intermittent pain since then. Denies current symptoms today but wondering about an xray today  Denies any nighttime symptoms, obvious  deformity, redness, warmth, swelling        She notes chronic hip pain, previously followed by Dr. Marshall - spine care, she would like a follow up.   Notes bunion on right foot causing pain - requesting referral to podiatrist.   She does have stable medications and would like refill today.        Healthy Habits  Are you taking a daily aspirin? No  Do you typically exercising at least 40 min, 3-4 times per week?  Yes  Do you usually eat at least 4 servings of fruit and vegetables a day, include whole grains and fiber and avoid regularly eating high fat foods? Yes  Have you had an eye exam in the past two years? Yes  Do you see a dentist twice per year? Yes  Do you have any concerns regarding sleep? No    Safety Screen  If you own firearms, are they secured in a locked gun cabinet or with trigger locks? The patient does not own any firearms  Do you feel you are safe where you are living?: Yes (7/27/2018  8:49 AM)  Do you feel you are safe in your relationship(s)?: Yes (7/27/2018  8:49 AM)    Review of Systems:  Please see above.  The rest of the review of systems are negative for all systems.     Pap History:   every year d/t patient request  Cancer Screening       Status Date      MAMMOGRAM Next Due 7/28/2018      Done 7/28/2017 MAMMO SCREENING BILATERAL     Patient has more history with this topic...    PAP SMEAR Next Due 7/21/2022      Done 7/21/2017 GYNECOLOGIC CYTOLOGY (PAP SMEAR)     Patient has more history with this topic...    COLONOSCOPY Next Due 6/30/2026      Done 6/30/2016 COLONOSCOPY EXTERNAL RESULT            History     Reviewed By Date/Time Sections Reviewed    Sosa Loya NP 7/27/2018  8:16 PM Social Documentation    Sosa Loya NP 7/27/2018  8:15 PM Medical, Surgical, Tobacco, Alcohol, Drug Use, Sexual Activity, Family, Socioeconomic    Emi Bowers LPN 7/27/2018  8:49 AM Tobacco            Objective:   Vital Signs:   Visit Vitals     /62 (Patient Site: Left Arm, Patient  "Position: Sitting, Cuff Size: Adult Regular)     Pulse 72     Ht 5' 2\" (1.575 m)     Wt 131 lb (59.4 kg)     LMP 07/28/2010     BMI 23.96 kg/m2          PHYSICAL EXAM  Constitutional: Alert and oriented x3, well nourished without any acute distress  HENT:   Right Ear: External ear normal.   Left Ear: External ear normal.   Nose: Nose normal.   Mouth/Throat: Oropharynx is clear and moist.   Eyes: Conjunctivae and EOM are normal. Pupils are equal, round, and reactive to light. Right eye exhibits no discharge. Left eye exhibits no discharge.   Neck: No thyromegaly present.   Lymphadenopathy: Without palpable lymphadenopathy  Cardiovascular: Normal S1 and S2. Regular rate, rhythm and no murmur, rubs or gallops. Palpable distal pulses bilaterally.  Pulmonary/Chest: Normal effort. Lungs clear to auscultation in all lobes. Without wheezing, rhonchi or crackles.                      Abdominal: Soft, non tenderness. There is no rebound or guarding. Bowel sounds x4. Without organomegaly. No CVA tenderness.  Musculoskeletal: 5/5 strength  And full ROM in all extremities. No joint swelling or deformity. Without any left wrist pain today, normal ROM  Neurological: Cranial nerves intact, without deficit. Without numbness, tingling or paresthesia. Normal reflexes  Skin: Dry and intact; without rashes or lesions.   Psychiatric: Normal mood and affect.   : External female genitalia without lesions. Introitus is normal, vaginal walls pink and moist without lesions. There is no cervical motion tenderness and the adnexa are without masses. There is no abnormal discharge from the cervix.   Breast: No chest deformity, asymmetry. Normal contours. Without nodules, masses, tenderness, or axillary adenopathy. No nipple discharge or dimpling noted        Additional Screenings Completed Today:     "

## 2021-06-20 NOTE — PROGRESS NOTES
[facility]   [facility]-Pelvic Initial Evaluation    Patient Name: Jennifer Alexander  Date of evaluation: 9/28/2018  Referral Diagnosis: Lumbar spine pain  Referring provider: Quentin Marshall DO  Visit Diagnosis:     ICD-10-CM    1. DDD (degenerative disc disease), lumbar M51.36    2. Right buttock pain M79.1        Assessment:     Patient is returning to Physical Therapy after discharge from [facility] program 9 months ago. She is doing well but continues to have some difficulty with specific movements and myofascial pain. She normal lumbar ROM, unable to perform full wide legged squat. There is questionable right hip pathology. She benefits from manual therapy to address her myofascial complaints and would like to continue that course of therapy. She would benefit from further evaluation of the right hip and pelvic floor TrA activation or relaxation of the floor.   Pt. is appropriate for skilled PT intervention as outlined in the Plan of Care (POC).    Goals:  Pt will: increase functional movement with exercises, less difficulty, greater ease  Pt will: LEFS score 80/80 in 8 weeks    Patient's expectations/goals are realistic.    Barriers to Learning or Achieving Goals:  Chronicity of the problem.       Plan / Patient Instructions:        Plan of Care:   Communication with: Referral Source  Patient Related Instruction: Nature of Condition;Treatment plan and rationale;Self Care instruction;Basis of treatment;Posture  Times per Week: 2  Number of Weeks: 6  Number of Visits: 12  Discharge Planning: independent in self management of condition or plateau of progress  Precautions / Restrictions : none  Therapeutic Exercise: ROM;Stretching  Neuromuscular Reeducation: kinesio tape;posture;TNE;core  Manual Therapy: soft tissue mobilization;myofascial release    Plan for next visit: Continue manual to address right lumbar/hip fascial restriction     Subjective:       Social information:Works full/time as food  "    History of Present Illness:    Jennifer is a 53 y.o. female who presents to therapy today with complaints of lumbar and right hip/lateral leg pain. Date of onset/duration of symptoms is over one year ago. She initially started conservative PT , transitioning to [facility] program where she discharged with well managed symptoms. Symptoms are getting better.  She describes her previous level of function as not limited compared to a year ago. She is able to perform all functional activity but continues to notice dificulty with certain postures/positions in her movements. She is unable to perform a Aubrey squat for yoga, unable to rest heel on floor as well as standing hip abduction.    Pain Ratin  Pain rating at best: 0  Pain rating at worst: 10  Pain description: aching    Functional limitations are described as occurring with:   sports or recreation Difficulty performing aubrey squat, some movements with exercise    Patient reports benefit from:  movement or exercise , physical therapy, manual therapy and [facility] Patient states \"she does not want/need any new exercises, all she has had in the past are good. Would like a manual approach with sessions.          Objective:      Note: Items left blank indicates the item was not performed or not indicated at the time of the evaluation.    Patient Outcome Measures :    Lower Extremity Functional Scale (_/80): 77   Scores range from 0-80, where a score of 80 represents maximum function. The minimal clinically important difference is a positive change of 9 points.    Examination  1. DDD (degenerative disc disease), lumbar     2. Right buttock pain       Involved side: Right  Posture Observation:      General sitting posture is  normal.  General standing posture is normal. Increased thoracic paraspinal tone, increased lumbar curve  Performance of squat in clinic, unable to perform in clinic, unable to place/keep right heel on floor, left on floor    Lumbar ROM:    Date:    " "  *Indicate scale AROM AROM AROM   Lumbar Flexion Fingers to toes     Lumbar Extension Full no PDM      Right Left Right Left Right Left   Lumbar Sidebending = =       Lumbar Rotation WNL WNL       Thoracic Flexion      Thoracic Extension      Thoracic Sidebending         Thoracic Rotation           Lower Extremity Strength:     Date:      LE strength/5 Right Left Right Left Right Left   Hip Flexion (L1-3) 4+ 4+       Hip Extension (L5-S1)         Hip Abduction (L4-5) 4 4       Hip Adduction (L2-3)         Hip External Rotation         Hip Internal Rotation         Knee Extension (L3-4) 5 5       Knee Flexion 5 5       Ankle Dorsiflexion (L4-5) 5 5       Great Toe Extension (L5)         Ankle Plantar flexion (S1)         Abdominals        Sensation    intact       Reflex Testing  Lumbar Dermatomes Right Left UE Reflexes Right Left   Iliac Crest and Groin (L1)   Biceps (C5-6)     Anterior Medial Thigh (L2)   Brachioradialis (C5-6)     Anterior Thigh, Medial Epicondyle Femur (L3)   Triceps (C7-8)     Lateral Thigh, Anterior Knee, Medial Leg/Malleolus (L4)   Sonia s test     Lateral Leg, Dorsal Foot (L5)   LE Reflexes     Lateral Foot (S1)   Patellar (L3-4) 2+ 2+   Posterior Leg (S2)   Achilles (S1-2) 2+ 2+   Other:   Babinski Response       Palpation:    Lumbar Special Tests:     Lumbar Special Tests Right Left SI Tests Right  Left   Quadrant test   SI Compression     Straight leg raise +LLTT  SI Distraction     Crossover response   POSH Test     Slump   Sacral Thrust     Sit-up test  FADIR     Trunk extensor endurance test  Resisted Abduction     Prone instability test  Other:     Pubic shotgun  Other:           Treatment Today     TREATMENT MINUTES COMMENTS   Evaluation 25 lumbar   Self-care/ Home management     Manual therapy 20 L S/L Left  Thoracic rotation, right lumbar MWM, STM right gluteals and lateral hip with glute \"combing\"   Neuromuscular Re-education 10 Brief discussion of pain, nerve sliders " introduced again   Therapeutic Activity     Therapeutic Exercises     Gait training     Modality__________________                Total 55    Blank areas are intentional and mean the treatment did not include these items.            Delmy Martinez , PT  9/28/2018  9:26 AM      PT Evaluation Code: (Please list factors)  Patient History/Comorbidities: Chronic lumbar pain  Examination: myofascial restriction, decreased ROM  Clinical Presentation: stable  Clinical Decision Making: low    Patient History/  Comorbidities Examination  (body structures and functions, activity limitations, and/or participation restrictions) Clinical Presentation Clinical Decision Making (Complexity)   No documented Comorbidities or personal factors 1-2 Elements Stable and/or uncomplicated Low   1-2 documented comorbidities or personal factor 3 Elements Evolving clinical presentation with changing characteristics Moderate   3-4 documented comorbidities or personal factors 4 or more Unstable and unpredictable High

## 2021-06-20 NOTE — PROGRESS NOTES
"[facility] Daily Progress     Patient Name: Jennifer Alexander  Date: 10/5/2018  Visit #: 2  Referral Diagnosis: Lumbar and right hip pain, chronic  Referring provider: Quentin Marshall DO  Visit Diagnosis:     ICD-10-CM    1. DDD (degenerative disc disease), lumbar M51.36    2. Right buttock pain M79.18    3. Chronic hip pain, right M25.551     G89.29          Assessment:   Patient finds benefit in manual therapy to address lumbar/hip impairments. She declines addition of further exercises.  HEP/POC compliance is  good .  Patient is benefitting from skilled physical therapy and is making steady progress toward functional goals.    Goal Status:  No Data Recorded  Pt will: increase functional movement with exercises, less difficulty, greater ease  Pt will: LEFS score 80/80 in 8 weeks    Plan / Patient Education:     Continue with initial plan of care.  Progress with home program as tolerated. Manual     Subjective:     Pain Ratin  Right hip is more of an issue 3-4, aggravated with a particular exercise in balance and flex, using more heat and k-tape on the hip  Manual helpful  Still unable to set right heel down during aubrey squat in yoga, also notice discomfort with pressure on right hip during certain positions in yoga    Objective:     PATITO +  R  FAIR limited          Treatment Today     TREATMENT MINUTES COMMENTS   Evaluation     Self-care/ Home management 3 K-tape 3\" Y greater trochanter base, glute   Manual therapy 25 Hip mobes, inf/lateral A/P oscillations for pain, L S/L lumbar MWM,followed by MFR to gluteals, TFL/ITB   Neuromuscular Re-education     Therapeutic Activity     Therapeutic Exercises     Gait training     Modality__________________                Total 28    Blank areas are intentional and mean the treatment did not include these items.       Delmy Martinez, PT  10/5/2018    "

## 2021-06-20 NOTE — PROGRESS NOTES
Assessment/Plan:      Diagnoses and all orders for this visit:    Lumbar spine pain  -     Ambulatory referral to Physical Therapy    DDD (degenerative disc disease), lumbar  -     Ambulatory referral to Physical Therapy    Gluteal pain  -     Ambulatory referral to Physical Therapy    Hip tightness  -     Ambulatory referral to Physical Therapy        Assessment: Very pleasant 53-year-old female with a history of hypertension with:     1.  Persistent lumbar spine right gluteal pain to the greater trochanter.  Pain is at the lumbosacral junction right SI joint right gluteal region and lateral thigh.  The previous right lateral calf pain has basically resolved.  She is upwards of 85% improved over the past year however continues to have pain that is aggravating to her.  Most consistent with myofascial pain of the gluteal region.  2.  She does have degenerative disc disease L5-S1 an old MRI from 1 year ago with annular tear centrally no high-grade central canal or foraminal stenosis.  May contribute to low back pain    3.  Some mild osteoarthritis of the right hip which may also contribute.      Discussion:    1.   I discussed the diagnoses and treatment options.  We discussed that she still has some pain remaining and she is interested in further treatments to continue her improvement.  She is not interested in medications.  We discussed the option of greater trochanteric bursa injection or SI injection but she is not interested in steroids.    2.  Physical therapy for right gluteal strengthening.  Recommend pelvic floor strengthening and stabilization as well and SI program.  3.  We discussed more natural supplement such as tumor rec, green tea extract, co-Q10 all of which can be tried based on her tolerance of these.  4.  We will follow-up with me in 6 weeks.    25 minutes were spent with this patient in addition to any procedure with greater than 50% in counseling and coordination of care.      It was our pleasure  caring for your patient today, if there any questions or concerns please do not hesitate to contact us.      Subjective:   Patient ID: Jennifer Alexander is a 53 y.o. female.    History of Present Illness: Patient presents for evaluation of ongoing low back pain right gluteal pain greater trochanter pain.  Symptoms have improved since last visit which was proximally 1 year ago.  At that time she is doing physical therapy with [facility] and completed that has been doing exercises on her own.  She has had improvement with some of her exercises at home with the right SI pain and gluteal pain has improved range of motion.  She can do more core exercises and yoga such as sit ups with her legs crossed however she is still having some pain.    The pain is a 2/10 today 10/10 at worst.  Worse at the lumbosacral junction right PSIS SI joint radiates to the right greater trochanter.  There with heat stretching Kinesiotape Biofreeze massage and ibuprofen.  She continues to yoga regularly and her home exercises.  Has tried to meek in the past but gets GI upset.    Imaging: MRI report and images were personally reviewed and discussed with the patient.  A plastic model was utilized during the discussion.  MRI of the lumbar spine personally reviewed.  This is from August 2017.  Mild chronic L5 inferior endplate compression with mild edema likely degenerative.  L5-S1 T2 signal loss of the disc mild facet arthropathy may be high intensity zone with annular tear but no central stenosis or foraminal stenosis noted.    Plain films of the right hip show some mild osteoarthritis.    Review of Systems: No numbness, tingling or weakness.  No bowel or bladder incontinence.  No headaches, dizziness, nausea, vomiting, blurred vision or balance deficits.    Past Medical History:   Diagnosis Date     Hypertension        The following portions of the patient's history were reviewed and updated as appropriate: allergies, current medications, past family  history, past medical history, past social history, past surgical history and problem list.      Objective:   Physical Exam:    Vitals:    09/25/18 0959   BP: 104/78       General: Alert and oriented with normal affect. Attention, knowledge, memory, and language are intact. No acute distress.   Eyes: Sclerae are clear.  Respirations: Unlabored. CV: No lower extremity edema.   Gait:  Nonantalgic  Tenderness at the lumbosacral junction right PSIS.  Tenderness over the right gluteal tissue right SI joint significant mostly over the right greater trochanter.  She has pain and Donal's position in the right gluteal region and lateral hip.  Some pain with thigh thrust on the right but negative Gaenslen's on the right.  Negative on the left for SI maneuvers negative SI compression test.  Sensation is intact to light touch throughout the  lower extremities.  Reflexes are  . 2+ patellar and Achilles with downgoing toes.    Manual muscle testing reveals:  Right /Left out of 5     5/5 hip flexors  5/5 knee flexors  5/5 knee extensors  5/5 ankle plantar flexors  5/5 ankle dorsiflexors  5/5  EHL

## 2021-06-21 NOTE — PROGRESS NOTES
"[facility] Daily Progress     Patient Name: Jennifer Alexander  Date: 10/12/2018  Visit #: 3  Referral Diagnosis: Lumbar and right hip pain, chronic  Referring provider: Quentin Marshall DO  Visit Diagnosis:   No diagnosis found.      Assessment:   Patient demonstrating improvement in prayer squats following the manual therapy. She continues to be active with movement and tolerating manual therapy very well.   HEP/POC compliance is  good .  Patient is benefitting from skilled physical therapy and is making steady progress toward functional goals.    Goal Status:  No Data Recorded  Pt will: increase functional movement with exercises, less difficulty, greater ease  Pt will: LEFS score 80/80 in 8 weeks    Plan / Patient Education:     Continue with initial plan of care.  Progress with home program as tolerated. Manual     Subjective:     Pain Ratin  Patient would like veronica normal prayer squat. She said that the mobilizations last year    Objective:     PATITO +  R  FAIR limited          Treatment Today     TREATMENT MINUTES COMMENTS   Evaluation     Self-care/ Home management NN K-tape 3\" Y greater trochanter base, glute   Manual therapy 25 Patient positioned in supine- R hip mobilization 1) distraction with belt in hooklying and 90 degrees 2) MWM in IR/ER at 90 degree. MFR layer III to R adductor musculature    Patient positioned in L SL- MFR layer III to R hamstring and quad   Neuromuscular Re-education     Therapeutic Activity     Therapeutic Exercises     Gait training     Modality__________________                Total 25    Blank areas are intentional and mean the treatment did not include these items.       Romelia Chau, PT  10/12/2018    "

## 2021-06-21 NOTE — PROGRESS NOTES
"[facility] Daily Progress     Patient Name: Jennifer Alexander  Date: 11/2/2018  Visit #: 6  Referral Diagnosis: Lumbar and right hip pain, chronic  Referring provider: Quentin Marshall DO  Visit Diagnosis:     ICD-10-CM    1. DDD (degenerative disc disease), lumbar M51.36    2. Right buttock pain M79.18    3. Chronic hip pain, right M25.551     G89.29    4. Gluteal pain M79.18          Assessment:   Patient demonstrating improvement  following the manual therapy. She continues to be active with movement and tolerating manual therapy very well. Patient is also noting improvement from placement of the kinesiotape.   HEP/POC compliance is  good .  Patient is benefitting from skilled physical therapy and is making steady progress toward functional goals.    Goal Status:  No Data Recorded  Pt will: increase functional movement with exercises, less difficulty, greater ease  Pt will: LEFS score 80/80 in 8 weeks    Plan / Patient Education:     Continue with initial plan of care.  Progress with home program as tolerated.    Subjective:     Pain Rating: pre-ibp 2/10  Patient notes great improvement from the tape, requests again. Had a minor set back that resolved. Scheduled for Finger Phalanx surgery in December from home injury. In finger splint  Temporarily julio some soreness from manual therapy but beneficial.    Objective:     PATITO +  R  FAIR limited          Treatment Today     TREATMENT MINUTES COMMENTS   Evaluation     Self-care/ Home management 5 K-tape 3\" Y greater trochanter base, glute, I strip A/P at ASIS to L5-S1 and SI, PT verified flexibility in her prayer squat and is noting improvement in ease to assume the position.   Manual therapy 24 Patient positioned in supine- R hip mobilization 1) manual distraction with belt in hooklying and 90 degrees 2) lateral glide MWM in Flexion, IR/ER at 90 degree. MFR sustained  hip IR 90 seconds, oscillations to R VL musculature, quad     Patient positioned in L " "SL- Lumbar MWM  With hip inferior distraction Glut Med \"combing\"   Neuromuscular Re-education     Therapeutic Activity     Therapeutic Exercises     Gait training     Modality__________________                Total 29    Blank areas are intentional and mean the treatment did not include these items.       Delmy Martinez, PT  11/2/2018    "

## 2021-06-21 NOTE — PROGRESS NOTES
Chief complaint: Spreading rash.    HPI: The patient called in and spoke with the triage nurse about a rash that started on the right side of her back and there are now more clusters of the rash that are migrating around her ribs on the right and under her breast on the right.  It is really not painful or itchy but she comes in for an evaluation today    Objective:BP 98/72 (Patient Site: Right Arm, Patient Position: Sitting, Cuff Size: Adult Regular)   Pulse 88   Temp 98.4  F (36.9  C)   Wt 127 lb 8 oz (57.8 kg)   LMP 07/28/2010   SpO2 100%   BMI 23.32 kg/m    She has clusters of her vesicular rash with skip spots but it is in a dermatomal distribution at looks vesicular and it is only on one side of the body so this is consistent with shingles    Assessment: Varicella-zoster    Plan: I will do Valtrex 1 g 3 times a day for 7 days and then she can follow-up with her primary if there is no improvement.

## 2021-06-21 NOTE — PROGRESS NOTES
"[facility] Daily Progress     Patient Name: Jennifer Alexander  Date: 10/26/2018  Visit #: 5  Referral Diagnosis: Lumbar and right hip pain, chronic  Referring provider: Quentin Marshall DO  Visit Diagnosis:     ICD-10-CM    1. DDD (degenerative disc disease), lumbar M51.36    2. Right buttock pain M79.18    3. Chronic hip pain, right M25.551     G89.29          Assessment:   Patient demonstrating improvement  following the manual therapy. She continues to be active with movement and tolerating manual therapy very well. Patient is also noting improvement from placement of the kinesiotape.   HEP/POC compliance is  good .  Patient is benefitting from skilled physical therapy and is making steady progress toward functional goals.    Goal Status:  No Data Recorded  Pt will: increase functional movement with exercises, less difficulty, greater ease  Pt will: LEFS score 80/80 in 8 weeks    Plan / Patient Education:     Continue with initial plan of care.  Progress with home program as tolerated.    Subjective:     Pain Rating: pre-ibp 2/10  Patient noted great improvement from the tape and had five days without pain.   Temporarily julio some soreness from manual therapy .     Objective:     PATITO +  R  FAIR limited          Treatment Today     TREATMENT MINUTES COMMENTS   Evaluation     Self-care/ Home management 6 K-tape 3\" Y greater trochanter base, glute, PT verified flexibility in her prayer squat and is noting improvement in ease to assume the position.   Manual therapy 23 Patient positioned in supine- R hip mobilization 1) distraction with belt in hooklying and 90 degrees 2) MWM in IR/ER at 90 degree. MFR layer III to R adductor musculature, quad and hamstring    Patient positioned in L SL- MFR layer III to R hamstring and quad, Glut Med   Neuromuscular Re-education     Therapeutic Activity     Therapeutic Exercises     Gait training     Modality__________________                Total 29    Blank areas " are intentional and mean the treatment did not include these items.       Romelia Chau, PT  10/26/2018

## 2021-06-21 NOTE — PROGRESS NOTES
"[facility] Daily Progress     Patient Name: Jennifer Alexander  Date: 10/19/2018  Visit #: 4  Referral Diagnosis: Lumbar and right hip pain, chronic  Referring provider: Quentin Marshall DO  Visit Diagnosis:     ICD-10-CM    1. DDD (degenerative disc disease), lumbar M51.36    2. Right buttock pain M79.18    3. Chronic hip pain, right M25.551     G89.29          Assessment:   Patient demonstrating improvement  following the manual therapy. She continues to be active with movement and tolerating manual therapy very well.   HEP/POC compliance is  good .  Patient is benefitting from skilled physical therapy and is making steady progress toward functional goals.    Goal Status:  No Data Recorded  Pt will: increase functional movement with exercises, less difficulty, greater ease  Pt will: LEFS score 80/80 in 8 weeks    Plan / Patient Education:     Continue with initial plan of care.  Progress with home program as tolerated. Manual     Subjective:     Pain Ratin  Patient noted improvement following the manual therapy last session. She is noting increased tension in adductor musculature this visit.     Objective:     PATITO +  R  FAIR limited          Treatment Today     TREATMENT MINUTES COMMENTS   Evaluation     Self-care/ Home management 3 K-tape 3\" Y greater trochanter base, glute   Manual therapy 23 Patient positioned in supine- R hip mobilization 1) distraction with belt in hooklying and 90 degrees 2) MWM in IR/ER at 90 degree. MFR layer III to R adductor musculature, quad and hamstring    Patient positioned in L SL- MFR layer III to R hamstring and quad, Glut Med, Passive IR stretch to R hip   Neuromuscular Re-education     Therapeutic Activity     Therapeutic Exercises     Gait training     Modality__________________                Total 26    Blank areas are intentional and mean the treatment did not include these items.       Romelia Chau, PT  10/19/2018    "

## 2021-06-21 NOTE — PROGRESS NOTES
Assessment/Plan:        Diagnoses and all orders for this visit:    Cellulitis of right finger  I do suspect cellulitis, redness, warmth to touch.  Reviewed previous xray taken of right hand 1/26/2018   Will treat with Cephalexin. Discussed side effects of medications and proper use. Patient verbalized understanding.  Reviewed home supportive care.   Follow up if worsening symptoms, questions or concerns  Ortho referral placed for evaluation and review of her abnormal xray.   Discussed red flags that would warrant urgent evaluation. Patient verbalized understanding.  Patient agreed and appeared pleased with plan    -     cephalexin (KEFLEX) 500 MG capsule; Take 1 capsule (500 mg total) by mouth 4 (four) times a day for 10 days.  Dispense: 40 capsule; Refill: 0  -     Ambulatory referral to Orthopedics    Hypertension, essential, benign  Stable, refilled medication  reinforced side effects and home supportive care of hypertension.   -     hydroCHLOROthiazide (HYDRODIURIL) 25 MG tablet; Take 1 tablet (25 mg total) by mouth 2 (two) times a day at 9am and 6pm.  Dispense: 135 tablet; Refill: 1          XR HAND RIGHT 2 VWS  1/26/2018 9:28 AM     INDICATION: right hand nodules/swollen areas  COMPARISON: None.     FINDINGS: No fracture or dislocation. No erosive changes. There is a bony exostosis along the ulnar aspect of the distal interphalangeal joint of the second digit. There is a small calcification along the dorsal aspect of the third distal interphalangeal   joint. These have a benign appearance, and radiographic follow-up could be considered. Consider MRI evaluation if surgical intervention is planned. There is minimal osteoarthritic change at the first carpal metacarpal joint and at the first   interphalangeal joint.    Subjective:    Patient ID: Jennifer Alexander is a 53 y.o. female.    BARRIE Rodriguez presents today for swelling in right hand, pointer figner  Started two days ago. She woke up with it.   Pain -  "she took 2 ibuprofen, 400mg Tuesday and Wednesday along with rubbing alcohol without any improvement  Denies any pain today but endorses pain with touch.   She can bend it but feels \"tight\" d/t swelling.   Denies new activity,denies trauma that she can remember. Although she state she was dusting any may have hit her finger.  Denies numbness, tingling.   Red and warm to the touch.         The following portions of the patient's history were reviewed and updated as appropriate: allergies, current medications, past family history, past medical history, past social history, past surgical history and problem list.    Review of Systems   Constitutional: Negative.    Respiratory: Negative.    Cardiovascular: Negative.    Skin:        Redness, warmth, swelling of second digit.    Psychiatric/Behavioral: Negative.              Objective:    Physical Exam   Constitutional: She is oriented to person, place, and time. She appears well-developed and well-nourished. No distress.   Cardiovascular: Normal rate and normal heart sounds.    No murmur heard.  Pulmonary/Chest: Effort normal and breath sounds normal.   Neurological: She is alert and oriented to person, place, and time.   Skin: She is not diaphoretic. There is erythema.   Redness, warmth, swelling of second digit, right hand   Psychiatric: She has a normal mood and affect. Her behavior is normal. Judgment normal.             Vitals:    10/25/18 1024 10/25/18 1026   BP: 102/78 102/86   Patient Site: Right Arm Left Arm   Patient Position: Sitting Sitting   Cuff Size: Adult Regular Adult Regular   Pulse:  74   SpO2:  99%   Weight: 128 lb (58.1 kg)      Current Outpatient Prescriptions   Medication Sig Dispense Refill     hydroCHLOROthiazide (HYDRODIURIL) 25 MG tablet TAKE 1 TABLET IN THE MORNING AND ONE-HALF (1/2) TABLET IN THE AFTERNOON 135 tablet 1     potassium chloride SA (K-DUR,KLOR-CON) 20 MEQ tablet TAKE 1 TABLET 5 TIMES DAILY 450 tablet 3     aspirin 81 mg chewable " tablet Chew 81 mg. Twice per week       ciclopirox (PENLAC) 8 % solution Apply over nail and surrounding skin. Apply daily over previous coat. After seven (7) days, may remove with alcohol and continue cycle. 6.6 mL 2     eflornithine (VANIQA) 13.9 % cream Apply as needed 45 g 2     multivitamin therapeutic (THERAGRAN) tablet Take 1 tablet by mouth daily.       No current facility-administered medications for this visit.

## 2021-06-21 NOTE — PROGRESS NOTES
"[facility] Daily Progress     Patient Name: Jennifer Alexander  Date: 11/9/2018  Visit #: 7  Referral Diagnosis: Lumbar and right hip pain, chronic  Referring provider: Quentin Marshall DO  Visit Diagnosis:     ICD-10-CM    1. DDD (degenerative disc disease), lumbar M51.36    2. Right buttock pain M79.18    3. Chronic hip pain, right M25.551     G89.29    4. Gluteal pain M79.18          Assessment:   Patient demonstrating improvement  following the manual therapy. She continues to be active with movement and tolerating manual therapy very well. Patient is also noting improvement from placement of the kinesiotape.   HEP/POC compliance is  good .  Patient is benefitting from skilled physical therapy and is making steady progress toward functional goals.    Goal Status:  No Data Recorded  Pt will: increase functional movement with exercises, less difficulty, greater ease  Pt will: LEFS score 80/80 in 8 weeks    Plan / Patient Education:     Continue with initial plan of care.  Progress with home program as tolerated. She is scheduled for surgery Dec 1 for finger phalanx     Subjective:     Pain Rating: pre-ibp 0-1/10  Patient notes great improvement from the tape, requests again. Had a minor set back that resolved. Scheduled for Finger Phalanx surgery in December from home injury. In finger splint  Temporarily julio some soreness from manual therapy but beneficial.    Objective:     PATITO +  R  FAIR limited          Treatment Today     TREATMENT MINUTES COMMENTS   Evaluation     Self-care/ Home management 5 K-tape 3\" Y greater trochanter base, glute, I strip A/P at ASIS to L5-S1 and SI, PT verified flexibility in her prayer squat and is noting improvement in ease to assume the position.   Manual therapy 30 Patient positioned in supine- R hip mobilization 1) manual distraction with in hooklying and 90 degrees 2) lateral glide MWM in Flexion, IR/ER at 90 degree. MFR sustained  hip IR 90 seconds, oscillations " "to R VL musculature, quad     Patient positioned in L SL- Lumbar MWM  With hip inferior distraction Glut Med \"combing\" and STM to RLE distal to proximal   Neuromuscular Re-education     Therapeutic Activity     Therapeutic Exercises     Gait training     Modality__________________                Total 35    Blank areas are intentional and mean the treatment did not include these items.       Delmy Martinez, PT  11/9/2018    "

## 2021-06-22 NOTE — PROGRESS NOTES
Assessment/Plan:        Rash, Shingles, resolved.   Recent diagnosis of shingles.   Did take the antiviral as prescribed.   Rash has improved. Education provided today regarding recent diagnosis.   Follow up if worsening symptoms, questions or concerns  Patient agreed and appeared pleased with plan      Subjective:    Patient ID: Jennifer Alexander is a 53 y.o. female.    54 y/o female present stoday for if she sits under neath a cold vent she would get chills in her back - she states this has been going on for years. . She would put a warm water bottle on her back and this helps her sensation.   She states she recent ly had this same sensation and then the next morning woke up with a rash, she was seen by  Dr. Blair. On 11/16/2018 and she did prescribe antiviral as she was diagnosed with Shingles. Her symptoms are fully improved.    Denies any other acute concerns,. Currently working with Dr. Luque for her decreased WBC count.   Also followed with hand specialit for upcoming finger surgery.           The following portions of the patient's history were reviewed and updated as appropriate: allergies, current medications, past family history, past medical history, past social history, past surgical history and problem list.    Review of Systems   All other systems reviewed and are negative.            Objective:    Physical Exam   Constitutional: She is oriented to person, place, and time. She appears well-developed and well-nourished. No distress.   Cardiovascular: Normal rate.   Pulmonary/Chest: Effort normal.   Neurological: She is alert and oriented to person, place, and time.   Skin: She is not diaphoretic.   Psychiatric: She has a normal mood and affect. Her behavior is normal. Judgment normal.           Vitals:    11/30/18 1522   BP: 98/54   Pulse: 60   Resp: 16   Weight: 128 lb 3.2 oz (58.2 kg)     Current Outpatient Medications   Medication Sig Dispense Refill     eflornithine (VANIQA) 13.9 % cream Apply as  needed 45 g 2     hydroCHLOROthiazide (HYDRODIURIL) 25 MG tablet Take 1 tablet (25 mg total) by mouth 2 (two) times a day at 9am and 6pm. 180 tablet 1     multivitamin therapeutic (THERAGRAN) tablet Take 1 tablet by mouth daily.       potassium chloride SA (K-DUR,KLOR-CON) 20 MEQ tablet TAKE 1 TABLET 5 TIMES DAILY 450 tablet 3     UNABLE TO FIND Med Name: .       ciclopirox (PENLAC) 8 % solution Apply over nail and surrounding skin. Apply daily over previous coat. After seven (7) days, may remove with alcohol and continue cycle. 6.6 mL 2     No current facility-administered medications for this visit.

## 2021-06-22 NOTE — PROGRESS NOTES
[facility] Discharge Summary  Patient Name: Jennifer Alexander  Date: 12/14/2018  Referral Diagnosis:   Referring provider: Sosa Loya NP  Visit Diagnosis:   1. DDD (degenerative disc disease), lumbar     2. Right buttock pain     3. Chronic hip pain, right         Goals:  No Data Recorded  Pt will: increase functional movement with exercises, less difficulty, greater ease  Pt will: LEFS score 80/80 in 8 weeks      Patient was seen for 10 visits to address right hip and lumbar pain.  The patient met goals and has demonstrated understanding of and independence in the home program for self-care, and progression to next steps.  She will initiate contact if questions or concerns arise.    Therapy will be discontinued at this time.  The patient will need a new referral to resume.    Thank you for your referral.  Delmy Martinez, PT  12/14/2018  5:29 PM    [facility] Daily Progress     Patient Name: Jennifer Alexander  Date: 12/14/2018  Visit #: 10  Referral Diagnosis: Lumbar and right hip pain, chronic  Referring provider: Sosa Loya NP  Visit Diagnosis:     ICD-10-CM    1. DDD (degenerative disc disease), lumbar M51.36    2. Right buttock pain M79.18    3. Chronic hip pain, right M25.551     G89.29          Assessment:   Patient demonstrating improvement  following the manual therapy. She continues to be active with movement and tolerating manual therapy very well. Patient is also noting improvement from placement of the kinesiotape.   HEP/POC compliance is  good .  Patient is benefitting from skilled physical therapy and is making steady progress toward functional goals.    Goal Status:  No Data Recorded  Pt will: increase functional movement with exercises, less difficulty, greater ease  Pt will: LEFS score 80/80 in 8 weeks      Plan / Patient Education:     Continue with initial plan of care.  Progress with home program as tolerated. She is scheduled for surgery Dec 5th for finger  "phalanx     Subjective:     Pain Rating: pre-ibp 0/10  Patient notes great improvement from the tape, requests again. Doing well. Scheduled for Finger Phalanx surgery in December from home injury. In finger splint  Temporarily julio some soreness from manual therapy but beneficial.    Objective:     PATITO +  R  FAIR limited      Able to perform squat mid range, continues to have difficulty keeping heel down for full yogi squat, improved    Treatment Today     TREATMENT MINUTES COMMENTS   Evaluation     Self-care/ Home management 5 K-tape 3\" Y greater trochanter base, glute, I strip A/P at ASIS to L5-S1 and SI, PT verified flexibility in her prayer squat and is noting improvement in ease to assume the position.   Manual therapy 25   Patient positioned in L SL- Lumbar MWM  With hip inferior distraction Glut Med \"combing\" and STM to RLE distal to proximal   Neuromuscular Re-education     Therapeutic Activity     Therapeutic Exercises     Gait training     Modality__________________                Total 30    Blank areas are intentional and mean the treatment did not include these items.       Delmy Martinez, PT  12/14/2018    "

## 2021-06-22 NOTE — PROGRESS NOTES
[facility] On[facility] Progress Note    Patient: Jennifer Alexander  MRN: [id number]  Date of Service: 12/05/2018        Reason for Visit    Chief Complaint   Patient presents with     Benign Hematology       Assessment and Plan  Cancer Staging  No matching staging information was found for the patient.    ECOG Performance   ECOG Performance Status: 0     Distress Assessment  Distress Assessment Score: No distress    Pain  Currently in Pain: Yes  Pain Score (Initial OR Reassessment): 1(1/10 at rest, 3/10 with movement, touch)  Location: R chest radiates around to back    #.  Mild leukopenia and history of mild neutropenia, cyclic of fluctuating since 2011 (based on the earliest record available)   I reviewed her hemogram from a week ago and she has mild leukopenia and neutropenia.  She had a couple prior normal WBC in May 2018.  Because of her recent infection, I suspect it could be related to recent shingle episode.  One other possibility of cyclic neutropenia due to chronicity and recurrent pattern.  No new medication except she has started taking probiotic.   I reassured her that her white count is mildly low that there is very low clinical impact and no excess risks of infection at this level.  Her hemoglobin and platelets were normal.  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  I suggested her to repeat once she is completely recover from this single episode.  We set to repeat hemogram in mid January and follow-up.  I will inform the result via my chart.    #.  History of Graves' disease many years ago.  Treated with radioiodine and methimazole.  She did not tolerate methimazole.  Currently followed by TSH and clinical exam.    Problem List    1. Cyclical neutropenia (H)  HM1(CBC and Differential)      ______________________________________________________________________________    History of Present Illness    Jennifer is here for follow up due to drop in WBC again.  She had shingles  in mid November and completed a course of antiviral medication about a week ago.  Shingles was in the right chest wraps around to back.  She is currently having some neuralgia.  No other infection symptoms.    Pain Status  Currently in Pain: Yes    Review of Systems    Constitutional  Constitutional (WDL): All constitutional elements are within defined limits  Neurosensory  Neurosensory (WDL): All neurosensory elements are within defined limits  Eye   Eye Disorder (WDL): All eye disorder elements are within defined limits  Ear  Ear Disorder (WDL): All ear disorder elements are within defined limits  Cardiovascular  Cardiovascular (WDL): All cardiovascular elements are within defined limits  Pulmonary  Respiratory (WDL): Within Defined Limits  Gastrointestinal  Gastrointestinal (WDL): All gastrointestinal elements are within defined limits  Genitourinary  Genitourinary (WDL): All genitourinary elements are within defined limits  Lymphatic  Lymph (WDL): All lymph disorder elements are within defined limits  Musculoskeletal and Connective Tissue  Musculoskeletal and Connetive Tissue Disorders (WDL): Exceptions to WDL  Arthralgia: Mild pain(R index finger)  Bone Pain: None  Muscle Weakness : None  Myalgia: None  Integumentary  Integumentary (WDL): Exceptions to WDL(Hx shingles to R chest that radiates around to back)  Patient Coping  Patient Coping: Accepting;Open/discussion  Distress Assessment  Distress Assessment Score: No distress  Accompanied by  Accompanied by: Alone  Oral Chemo Adherence       Past History  Past Medical History:   Diagnosis Date     Hypertension        Physical Exam    Recent Vitals 12/5/2018   Height -   Weight 128 lbs 14 oz   BSA (m2) 1.6 m2   /64   Pulse 77   Temp 98.5   Temp src 1   SpO2 100   Some recent data might be hidden     General: alert, awake, not in acute distress  HEENT: Head: Normal, normocephalic, atraumatic.  Eye: Normal external eye, conjunctiva, lids cornea, ENEIDA.  Ears:   Non-tender.  Nose: Normal external nose, mucus membranes and septum.  Pharynx: Dental Hygiene adequate. Normal buccal mucosa. Normal pharynx.  Neck / Thyroid: Supple, no masses, nodes, nodules or enlargement.  Lymphatics: No abnormally enlarged lymph nodes.  Chest: Normal chest wall and respirations. Clear to auscultation.  Heart: S1 S2 RRR, no murmur.   Abdomen: abdomen is soft without significant tenderness, masses, organomegaly or guarding  Extremities: normal strength, tone, and muscle mass  Skin: normal. no rash or abnormalities  CNS: non focal.    Lab Results    No results found for this or any previous visit (from the past 168 hour(s)).    Imaging    No results found.     TT: 40 minutes and more than 50% was spent on coordination and counseling of care.    Signed by: Shruthi Luque MD

## 2021-06-22 NOTE — PROGRESS NOTES
"[facility] Daily Progress     Patient Name: Jennifer Alexander  Date: 11/30/2018  Visit #: 9  Referral Diagnosis: Lumbar and right hip pain, chronic  Referring provider: Quentin Marshall DO  Visit Diagnosis:     ICD-10-CM    1. DDD (degenerative disc disease), lumbar M51.36    2. Right buttock pain M79.18    3. Chronic hip pain, right M25.551     G89.29    4. Gluteal pain M79.18          Assessment:   Patient demonstrating improvement  following the manual therapy. She continues to be active with movement and tolerating manual therapy very well. Patient is also noting improvement from placement of the kinesiotape.   HEP/POC compliance is  good .  Patient is benefitting from skilled physical therapy and is making steady progress toward functional goals.    Goal Status:  No Data Recorded  Pt will: increase functional movement with exercises, less difficulty, greater ease  Pt will: LEFS score 80/80 in 8 weeks      Plan / Patient Education:     Continue with initial plan of care.  Progress with home program as tolerated. She is scheduled for surgery Dec 5th for finger phalanx     Subjective:     Pain Rating: pre-ibp 0/10  Patient notes great improvement from the tape, requests again. Doing well. Scheduled for Finger Phalanx surgery in December from home injury. In finger splint  Temporarily julio some soreness from manual therapy but beneficial.    Objective:     PATITO +  R  FAIR limited      Able to perform squat mid range, continues to have difficulty keeping heel down for full yogi squat, improved    Treatment Today     TREATMENT MINUTES COMMENTS   Evaluation     Self-care/ Home management 5 K-tape 3\" Y greater trochanter base, glute, I strip A/P at ASIS to L5-S1 and SI, PT verified flexibility in her prayer squat and is noting improvement in ease to assume the position.   Manual therapy 25 Patient positioned in prone, Sacral inf glide, STM to posterior paraspinals R QL, gluteal, HS, gastroc, lateral hip " "ITB    Patient positioned in L SL- Lumbar MWM  With hip inferior distraction Glut Med \"combing\" and STM to RLE distal to proximal   Neuromuscular Re-education     Therapeutic Activity     Therapeutic Exercises     Gait training     Modality__________________                Total 30    Blank areas are intentional and mean the treatment did not include these items.       Delmy Martinez, PT  11/30/2018    "

## 2021-06-22 NOTE — PROGRESS NOTES
Admission History & Physical  Jennifer Alexander, 1965, [id number]    [facility]  Vincenzo Sosa Doe NP, [phone]    Extended Emergency Contact Information  Primary Emergency Contact: [place]   [place]  Home Phone: [phone]  Work Phone: [phone]  Relation: Significant Other     Assessment and Plan:   Assessment: Hallux abductovalgus bilateral feet, hammertoe bilateral feet  Plan: I recommended surgical correction of the patient's multiple deformities.  I have recommended a Lapidus bunionectomy both feet and hammertoe corrections both feet  Active Problems:    * No active hospital problems. *      Chief Complaint:  Foot deformity     HPI:    Jennifer Alexander is a 53 y.o. old female who presented to the clinic today to have her feet evaluated.  The patient stated that she has bunions and hammertoes of both feet.  She has had these problems for most of her life.  She stated that this inherited problem from both of her parents.  She grew up in the [facility].  She stated she did not have the best fitting shoes during that time.  She feels this attributed to the verity of her deformities.  She only has mild to moderate discomfort periodically with both feet.  She has calluses which she reduces periodically.  She is very careful when it comes to choose a her foot wear.  She denies any other previous treatment.  History is provided by patient    Medical History  Active Ambulatory (Non-Hospital) Problems    Diagnosis     Hip pain, acute, right     Toenail fungus     Essential hypertension     Past Medical History:   Diagnosis Date     Hypertension      Patient Active Problem List    Diagnosis Date Noted     Hip pain, acute, right 01/28/2016     Toenail fungus 01/28/2016     Essential hypertension 01/28/2016     Surgical History  She  has a past surgical history that includes Oophorectomy and Hysterectomy.   Past Surgical History:   Procedure Laterality Date     HYSTERECTOMY       "supracervical 2010     OOPHORECTOMY      1 removed, 1 remains    Social History  Reviewed, and she  reports that  has never smoked. she has never used smokeless tobacco. She reports that she does not drink alcohol or use drugs.  Social History     Tobacco Use     Smoking status: Never Smoker     Smokeless tobacco: Never Used   Substance Use Topics     Alcohol use: No      Allergies  Allergies   Allergen Reactions     Hydrocodone Shortness Of Breath and Nausea And Vomiting     Tetracycline Nausea And Vomiting    Family History  Reviewed, and family history includes Hypertension in her father and mother; Stroke in her maternal grandfather.   Psychosocial Needs  Social History     Social History Narrative     Not on file     Additional psychosocial needs reviewed per nursing assessment.       Prior to Admission Medications     (Not in a hospital admission)        Review of Systems - Negative     /66   Pulse 60   Resp 12   Ht 5' 2\" (1.575 m)   Wt 128 lb (58.1 kg)   LMP 07/28/2010   BMI 23.41 kg/m      Objective findings: General: The patient is alert and in no acute distress    Integument: Nails bilateral feet are normal length and color.  Skin bilaterally warm and intact.  There are hyperkeratotic lesions on the dorsal aspect of the toes both feet.    Vascular: DP and PT pulses are palpable bilaterally.  Capillary refill less than 2 seconds bilaterally.    Neurologic: Negative clonus, negative Babinski bilateral feet.    Musculoskeletal: Range of motion within normal limits bilateral feet.  Muscle power within normal limits bilateral feet.  There is a large firm subcutaneous mass on the right of the head of the first metatarsal bilateral feet.  There is lateral deviation of the hallux with buttresses the second toe both feet.  The first metatarsophalangeal joint is track bound bilateral feet.  There is hypomobility noted at the base of the first metatarsocuneiform joint bilaterally.  There is no edema or " erythema noted bilateral feet.  There are flexion deformities at the proximal interphalangeal joint third toe both feet.    Assessment: Hallux abductovalgus both feet and hammertoes both feet    Plan: I informed the patient that she does have structural deformities which could be corrected with surgical intervention.  I briefly described these procedures to the patient.  The patient stated she would contemplate having these procedures performed a later date.  If she decides to move forward with her surgical treatment plan she is to return to the clinic at which time x-rays will be taken and the procedure will be described to the patient in complete detail.

## 2021-06-23 NOTE — TELEPHONE ENCOUNTER
Called patient to let her know lab results from yesterday are normal. Per Dr. Luque, follow-up prn.  Patient happy to hear the news and thanked me for calling/NOLNA Desai RN.

## 2021-06-27 ENCOUNTER — HEALTH MAINTENANCE LETTER (OUTPATIENT)
Age: 56
End: 2021-06-27

## 2021-06-27 NOTE — PROGRESS NOTES
Progress Notes by Yue Zuñiga MD at 7/16/2019  8:20 AM     Author: Yue Zuñiga MD Service: -- Author Type: Physician    Filed: 7/16/2019  2:21 PM Encounter Date: 7/16/2019 Status: Signed    : Yue Zuñiga MD (Physician)       FEMALE PREVENTATIVE EXAM    Assessment and Plan:       Jennifer was seen today for establish care, annual exam, medication management and x-ray.    Routine general medical examination at a health care facility  -     Glycosylated Hemoglobin A1c  -     Lipid Cascade  -     Thyroid Stimulating Hormone (TSH)    Cyclical neutropenia (H)  -     HM2(CBC w/o Differential)    Essential hypertension  -     Comprehensive Metabolic Panel  -     potassium chloride (K-DUR,KLOR-CON) 20 MEQ tablet; TAKE 2 TABLETS IN THE MORNING AND 3 TABLETS IN THE EVENING (5 TOTAL PER DAY)    Vitamin deficiency  -     Vitamin D, Total (25-Hydroxy)    Screening for cervical cancer  -     Gynecologic Cytology (PAP Smear)    Toenail fungus  -     ciclopirox (PENLAC) 8 % solution; Apply over nail and surrounding skin. Apply daily over previous coat. After seven (7) days, may remove with alcohol and continue cycle.    Hypertension, essential, benign  -     hydroCHLOROthiazide (HYDRODIURIL) 25 MG tablet; Take 1 tablet (25 mg total) by mouth 2 (two) times a day at 9am and 6pm.    Visit for screening mammogram  -     Mammo Screening Bilateral; Future    Pelvic pain in female  -     US Pelvis With Transvaginal Non OB; Future    Atrophic vaginitis  -     US Pelvis With Transvaginal Non OB; Future    Pain of finger of right hand  -     XR Finger Right 2 or More VWS      During the Pap smear she were feeling exactly the same pain as she feels off-and-on in the left lower quadrant patient does have a findings of atrophic vaginitis some bleeding when Pap smear sample was taken slight pain on the left lower quadrant.  As patient willing not easily be reassured that it could be just the benign finding most likely pelvic floor  muscle pain we did order the pelvic ultrasound to rule out any anatomical cause for the pain  Patient also like to have x-ray of her middle finger of the right hand with a history of bony cyst on the index finger looks like her [facility] like to have a follow-up x-ray every year which was ordered today  Patient planning to move to [place] in 1 to 3-month refill on all her prescription which were done today and made a copy for them  Patient wanted a copy for all her referrals also which was done today  She wanted very specific labs done today which were also ordered per her request blood pressure is well controlled today    Next follow up:  1 yr for annual physical    Immunization Reviewed and if needed ordered please see A/P    There are no preventive care reminders to display for this patient.    Immunization History   Administered Date(s) Administered   ? Influenza, inj, historic,unspecified 09/28/2015, 09/25/2017, 09/10/2018   ? Tdap 06/12/2015         I discussed the following with the patient:   Adult Healthy Living: Importance of regular exercise  Healthy nutrition  Getting adequate sleep  Stress management  Supplement use  Herbal medications/alternative medical therapies    The following high BMI interventions were performed this visit: dietary management education, guidance, and counseling    I have had an Advance Directives discussion with the patient.    Subjective:   Chief Complaint: Jennifer Alexander is an 54 y.o. female here for a preventative health visit.     HPI: Patient who is new to my practice who has very significant and quite demanding request throughout this appointment.  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  After long discussion about no need for every year Pap smear I did go ahead and update her Pap smear per her request with HPV screening  Patient status post total abdominal hysterectomy with sparing of the cervix with left  Oophorectomy.    Left lower quadrant pain: Comes and goes no specific interval she can identify.  Patient is status post left oophorectomy.  Denies any constipation or diarrhea denies any pain during sexual activity or any abnormal vaginal discharge.     Patient's last menstrual period was 07/28/2010.       Healthy Habits  Are you taking a daily aspirin? No  Do you typically exercising at least 40 min, 3-4 times per week?  Yes  Do you usually eat at least 4 servings of fruit and vegetables a day, include whole grains and fiber and avoid regularly eating high fat foods? Yes  Have you had an eye exam in the past two years? Yes  Do you see a dentist twice per year? Yes  Do you have any concerns regarding sleep? No    Safety Screen  If you own firearms, are they secured in a locked gun cabinet or with trigger locks? The patient does not own any firearms    Do you feel you are safe where you are living?: Yes (7/16/2019  8:51 AM)  Do you feel you are safe in your relationship(s)?: Yes (7/16/2019  8:51 AM)      Review of Systems:  Please see above.  The rest of the review of systems are negative for all systems.     Pap History:   Yes - updated in Problem List and Health Maintenance accordingly    Cancer Screening       Status Date      MAMMOGRAM Next Due 8/3/2020      Done 8/3/2018 MAMMO SCREENING BILATERAL     Patient has more history with this topic...    PAP SMEAR Next Due 7/27/2023      Done 7/27/2018 GYNECOLOGIC CYTOLOGY (PAP SMEAR)     Patient has more history with this topic...    COLONOSCOPY Next Due 6/30/2026      Done 6/30/2016 COLONOSCOPY EXTERNAL RESULT          Patient Care Team:  Provider, No Primary Care as PCP - General  Thaw,  "Shruthi CHARLES MD as Physician (Hematology and Oncology)        History     Reviewed By Date/Time Sections Reviewed    Melisa KeaneJAYE 7/16/2019  8:51 AM Tobacco            Objective:   Vital Signs:   Visit Vitals  /76 (Patient Site: Left Arm, Patient Position: Sitting, Cuff Size: Adult Regular)   Pulse 78   Ht 5' 2\" (1.575 m)   Wt 129 lb 3.2 oz (58.6 kg)   LMP 07/28/2010   Breastfeeding? No   BMI 23.63 kg/m         PHYSICAL EXAM  Physical Exam:  General Appearance:  Appears comfortable, Alert, cooperative, no distress,   Head: Normocephalic, without obvious abnormality, atraumatic  Eyes: PERRL, conjunctiva/corneas clear, EOM's intact, both eyes             Nose: Nares normal, no drainage   Throat: Lips, mucosa, and tongue normal; teeth and gums normal  Neck: Supple, symmetrical, trachea midline, no adenopathy;                      Lungs: Clear to auscultation bilaterally, respirations unlabored  Pelvic exam: normal external genitalia, vulva, vagina, cervix, uterus and adnexa, VAGINA: atrophic, PAP: Pap smear done today.  Heart: Regular rate and rhythm, S1 and S2 normal, no murmur, rubs or gallop  Abdomen: Soft, non-tender, bowel sounds active all four quadrants,   no masses, no organomegaly  Extremities: Extremities normal, atraumatic, no cyanosis or edema  Pulses: DP pulses are 1-2+ bilat.    Skin: no rashes or lesions  Neurologic: normal and equal strength bilat in upper and lower extremities   lla        The ASCVD Risk score (Rosa DC Jr., et al., 2013) failed to calculate for the following reasons:    Unable to determine if patient is Non- African American         Medication List           Accurate as of 7/16/19  2:20 PM. If you have any questions, ask your nurse or doctor.               START taking these medications    potassium chloride 20 MEQ tablet  Also known as:  K-DUR,KLOR-CON  INSTRUCTIONS:  TAKE 2 TABLETS IN THE MORNING AND 3 TABLETS IN THE EVENING (5 TOTAL PER DAY)  Started by:  " Yue Zuñiga MD           CONTINUE taking these medications    CHEWABLE PROBIOTIC ORAL  INSTRUCTIONS:  Take 1 tablet by mouth daily.        ciclopirox 8 % solution  Also known as:  PENLAC  INSTRUCTIONS:  Apply over nail and surrounding skin. Apply daily over previous coat. After seven (7) days, may remove with alcohol and continue cycle.  Reason for med:  KNOWN/SUSPECTED INFECTION        eflornithine 13.9 % cream  Also known as:  VANIQA  INSTRUCTIONS:  Apply as needed        hydroCHLOROthiazide 25 MG tablet  Also known as:  HYDRODIURIL  INSTRUCTIONS:  Take 1 tablet (25 mg total) by mouth 2 (two) times a day at 9am and 6pm.        multivitamin therapeutic tablet  Also known as:  THERAGRAN  INSTRUCTIONS:  Take 1 tablet by mouth daily.        UNABLE TO FIND  INSTRUCTIONS:  Take 2 tablets by mouth daily Evening Primrose Oil.  Reason for med:  evening primrose oil              Where to Get Your Medications      You can get these medications from any pharmacy    Bring a paper prescription for each of these medications    ciclopirox 8 % solution    hydroCHLOROthiazide 25 MG tablet    potassium chloride 20 MEQ tablet         Additional Screenings Completed Today:

## 2021-07-03 NOTE — ADDENDUM NOTE
Addendum Note by Hemant Richardson MD at 8/27/2019  2:28 PM     Author: Hemant Richardson MD Service: -- Author Type: Physician    Filed: 8/27/2019  2:28 PM Encounter Date: 8/27/2019 Status: Signed    : Hemant Richardson MD (Physician)    Addended by: HEMANT RICHARDSON on: 8/27/2019 02:28 PM        Modules accepted: Orders

## 2021-07-03 NOTE — ADDENDUM NOTE
Addendum Note by Yue Zuñiga MD at 5/11/2020  9:51 AM     Author: Yue Zuñiga MD Service: -- Author Type: Physician    Filed: 5/11/2020  9:51 AM Encounter Date: 5/6/2020 Status: Signed    : Yue Zuñiga MD (Physician)    Addended by: YUE ZUÑIGA on: 5/11/2020 09:51 AM        Modules accepted: Orders

## 2021-07-03 NOTE — ADDENDUM NOTE
Addendum Note by Naomy Nuno CMA at 8/27/2019  2:21 PM     Author: Naomy Nuno CMA Service: -- Author Type: Certified Medical Assistant    Filed: 8/27/2019  2:21 PM Encounter Date: 8/27/2019 Status: Signed    : Naomy Nuno CMA (Certified Medical Assistant)    Addended by: HARJINDER NUNO on: 8/27/2019 02:21 PM        Modules accepted: Orders

## 2021-07-03 NOTE — ADDENDUM NOTE
Addendum Note by Melisa Rendon CMA at 5/13/2020  9:43 AM     Author: Melisa Rendon CMA Service: -- Author Type: Certified Medical Assistant    Filed: 5/13/2020  9:43 AM Encounter Date: 5/11/2020 Status: Signed    : Melisa Rendon CMA (Certified Medical Assistant)    Addended by: MELISA RENDON on: 5/13/2020 09:43 AM        Modules accepted: Orders

## 2021-10-17 ENCOUNTER — HEALTH MAINTENANCE LETTER (OUTPATIENT)
Age: 56
End: 2021-10-17

## 2022-01-10 NOTE — PROGRESS NOTES
"[facility] Daily Progress     Patient Name: Jennifer Alexander  Date: 11/16/2018  Visit #: 8  Referral Diagnosis: Lumbar and right hip pain, chronic  Referring provider: Quentin Marshall DO  Visit Diagnosis:     ICD-10-CM    1. DDD (degenerative disc disease), lumbar M51.36    2. Right buttock pain M79.18    3. Chronic hip pain, right M25.551     G89.29    4. Gluteal pain M79.18          Assessment:   Patient demonstrating improvement  following the manual therapy. She continues to be active with movement and tolerating manual therapy very well. Patient is also noting improvement from placement of the kinesiotape.   HEP/POC compliance is  good .  Patient is benefitting from skilled physical therapy and is making steady progress toward functional goals.    Goal Status:  No Data Recorded  Pt will: increase functional movement with exercises, less difficulty, greater ease  Pt will: LEFS score 80/80 in 8 weeks      Plan / Patient Education:     Continue with initial plan of care.  Progress with home program as tolerated. She is scheduled for surgery Dec 5th for finger phalanx     Subjective:     Pain Rating: pre-ibp 0/10  Patient notes great improvement from the tape, requests again. Doing well. Scheduled for Finger Phalanx surgery in December from home injury. In finger splint  Temporarily julio some soreness from manual therapy but beneficial.    Objective:     PATITO +  R  FAIR limited          Treatment Today     TREATMENT MINUTES COMMENTS   Evaluation     Self-care/ Home management 5 K-tape 3\" Y greater trochanter base, glute, I strip A/P at ASIS to L5-S1 and SI, PT verified flexibility in her prayer squat and is noting improvement in ease to assume the position.   Manual therapy 35 Patient positioned in supine- R hip mobilization 1) manual distraction with in hooklying and 90 degrees 2) lateral glide MWM in Flexion, IR/ER at 90 degree. MFR sustained  hip IR 90 seconds, oscillations to R VL musculature, " Information noted that ACL cannot do home lab draw.  Please check with [facility] to see if they are able to go in to do lab draw for patient as he is unable to leave home.   "quad     Patient positioned in L SL- Lumbar MWM  With hip inferior distraction Glut Med \"combing\" and STM to RLE distal to proximal   Neuromuscular Re-education     Therapeutic Activity     Therapeutic Exercises     Gait training     Modality__________________                Total 40    Blank areas are intentional and mean the treatment did not include these items.       Delmy Martinez, PT  11/16/2018    "

## 2022-07-23 ENCOUNTER — HEALTH MAINTENANCE LETTER (OUTPATIENT)
Age: 57
End: 2022-07-23

## 2022-10-01 ENCOUNTER — HEALTH MAINTENANCE LETTER (OUTPATIENT)
Age: 57
End: 2022-10-01

## 2023-08-12 ENCOUNTER — HEALTH MAINTENANCE LETTER (OUTPATIENT)
Age: 58
End: 2023-08-12

## 2023-10-21 ENCOUNTER — HEALTH MAINTENANCE LETTER (OUTPATIENT)
Age: 58
End: 2023-10-21